# Patient Record
Sex: FEMALE | Race: BLACK OR AFRICAN AMERICAN | NOT HISPANIC OR LATINO | Employment: FULL TIME | URBAN - METROPOLITAN AREA
[De-identification: names, ages, dates, MRNs, and addresses within clinical notes are randomized per-mention and may not be internally consistent; named-entity substitution may affect disease eponyms.]

---

## 2017-01-18 ENCOUNTER — TELEPHONE (OUTPATIENT)
Dept: OBSTETRICS AND GYNECOLOGY | Facility: CLINIC | Age: 32
End: 2017-01-18

## 2017-01-18 ENCOUNTER — LAB VISIT (OUTPATIENT)
Dept: LAB | Facility: HOSPITAL | Age: 32
End: 2017-01-18
Attending: SPECIALIST
Payer: COMMERCIAL

## 2017-01-18 DIAGNOSIS — N91.2 AMENORRHEA: ICD-10-CM

## 2017-01-18 DIAGNOSIS — N91.2 AMENORRHEA: Primary | ICD-10-CM

## 2017-01-18 LAB
ESTRADIOL SERPL-MCNC: 98 PG/ML
FSH SERPL-ACNC: 3.4 MIU/ML
LH SERPL-ACNC: 3.9 MIU/ML
TSH SERPL DL<=0.005 MIU/L-ACNC: 1 UIU/ML

## 2017-01-18 PROCEDURE — 83002 ASSAY OF GONADOTROPIN (LH): CPT

## 2017-01-18 PROCEDURE — 36415 COLL VENOUS BLD VENIPUNCTURE: CPT | Mod: PO

## 2017-01-18 PROCEDURE — 83001 ASSAY OF GONADOTROPIN (FSH): CPT

## 2017-01-18 PROCEDURE — 84402 ASSAY OF FREE TESTOSTERONE: CPT

## 2017-01-18 PROCEDURE — 84443 ASSAY THYROID STIM HORMONE: CPT

## 2017-01-18 PROCEDURE — 82670 ASSAY OF TOTAL ESTRADIOL: CPT

## 2017-01-18 NOTE — TELEPHONE ENCOUNTER
Last cycle in November.  Negative UPT.  Tubal ligation.  Thinks her hormones are off.    Please advise.

## 2017-01-18 NOTE — TELEPHONE ENCOUNTER
----- Message from Carmen Swift sent at 1/18/2017  7:37 AM CST -----  Patient requesting to speak with nurse concerning personal matter (only information given)please call patient back at [phone] to advise.

## 2017-01-22 LAB — TESTOST FREE SERPL-MCNC: 1.1 PG/ML

## 2017-01-23 ENCOUNTER — TELEPHONE (OUTPATIENT)
Dept: OBSTETRICS AND GYNECOLOGY | Facility: CLINIC | Age: 32
End: 2017-01-23

## 2017-01-23 NOTE — TELEPHONE ENCOUNTER
----- Message from Karen Miner sent at 1/23/2017 12:26 PM CST -----  Contact: self  Patient called regarding lab results. Please contact [phone]

## 2017-01-24 ENCOUNTER — PATIENT MESSAGE (OUTPATIENT)
Dept: OBSTETRICS AND GYNECOLOGY | Facility: CLINIC | Age: 32
End: 2017-01-24

## 2017-01-24 NOTE — TELEPHONE ENCOUNTER
Patient notified.  Questions what the next step would be.  Amenorrhea x 2 months.  Negative UPT and tubal ligation

## 2017-01-31 ENCOUNTER — OFFICE VISIT (OUTPATIENT)
Dept: OBSTETRICS AND GYNECOLOGY | Facility: CLINIC | Age: 32
End: 2017-01-31
Payer: COMMERCIAL

## 2017-01-31 VITALS — WEIGHT: 175.94 LBS | BODY MASS INDEX: 30.04 KG/M2 | HEIGHT: 64 IN

## 2017-01-31 DIAGNOSIS — Z01.419 NORMAL GYNECOLOGIC EXAMINATION: Primary | ICD-10-CM

## 2017-01-31 DIAGNOSIS — N76.0 BV (BACTERIAL VAGINOSIS): ICD-10-CM

## 2017-01-31 DIAGNOSIS — B96.89 BV (BACTERIAL VAGINOSIS): ICD-10-CM

## 2017-01-31 PROCEDURE — 1159F MED LIST DOCD IN RCRD: CPT | Mod: S$GLB,,, | Performed by: SPECIALIST

## 2017-01-31 PROCEDURE — 88175 CYTOPATH C/V AUTO FLUID REDO: CPT

## 2017-01-31 PROCEDURE — 99999 PR PBB SHADOW E&M-EST. PATIENT-LVL III: CPT | Mod: PBBFAC,,, | Performed by: SPECIALIST

## 2017-01-31 PROCEDURE — 99395 PREV VISIT EST AGE 18-39: CPT | Mod: S$GLB,,, | Performed by: SPECIALIST

## 2017-01-31 RX ORDER — METRONIDAZOLE 500 MG/1
500 TABLET ORAL EVERY 12 HOURS
Qty: 14 TABLET | Refills: 0 | Status: SHIPPED | OUTPATIENT
Start: 2017-01-31 | End: 2017-02-07

## 2017-01-31 NOTE — PROGRESS NOTES
30 yo BF presents for annual gyn evaluation. Pt c/o vaginal d/c with itching on occasion.  Past Medical History   Diagnosis Date    Abnormal Pap smear      cryo    Abnormal Pap smear of vagina      cryo    Constipation 2011       Past Surgical History   Procedure Laterality Date    Tubal ligation      Cryotherapy         Family History   Problem Relation Age of Onset    Other Father     Multiple myeloma Father       in 60s    Hypertension Mother     Diabetes type II Brother     Breast cancer Neg Hx     Ovarian cancer Neg Hx        Social History     Social History    Marital status: Single     Spouse name: N/A    Number of children: N/A    Years of education: N/A     Social History Main Topics    Smoking status: Never Smoker    Smokeless tobacco: Never Used    Alcohol use Yes      Comment: 1 glass per night.     Drug use: No    Sexual activity: Yes     Partners: Male     Birth control/ protection: Condom, Surgical     Other Topics Concern    None     Social History Narrative       Current Outpatient Prescriptions   Medication Sig Dispense Refill    alprazolam (XANAX) 0.25 MG tablet Take 1 tablet (0.25 mg total) by mouth 3 (three) times daily as needed for Insomnia. 1 Tablet(s) Oral PRN Every 8 hours. 60 tablet 0    metoprolol tartrate (LOPRESSOR) 25 MG tablet Take one or 2 daily for test anxiety 20 tablet 0     No current facility-administered medications for this visit.        Review of patient's allergies indicates:   Allergen Reactions    No known drug allergies        Review of System:   General: no chills, fever, night sweats, weight gain or weight loss  Psychological: no depression or suicidal ideation  Breasts: no new or changing breast lumps, nipple discharge or masses.  Respiratory: no cough, shortness of breath, or wheezing  Cardiovascular: no chest pain or dyspnea on exertion  Gastrointestinal: no abdominal pain, change in bowel habits, or black or bloody  stools  Genito-Urinary: no incontinence, urinary frequency/urgency or vulvar/vaginal symptoms, pelvic pain or abnormal vaginal bleeding.  Musculoskeletal: no gait disturbance or muscular weakness    General Appearance:  Alert, cooperative, no distress, appears stated age   Head:  Normocephalic, without obvious abnormality, atraumatic   Eyes:  PERRL, conjunctiva/corneas clear, EOM's intact, fundi benign, both eyes   Ears:  Normal TM's and external ear canals, both ears   Nose: Nares normal, septum midline,mucosa normal, no drainage or sinus tenderness   Throat: Lips, mucosa, and tongue normal; teeth and gums normal   Neck: Supple, symmetrical, trachea midline, no adenopathy;  thyroid: not enlarged, symmetric, no tenderness/mass/nodules; no carotid bruit or JVD   Back:   Symmetric, no curvature, ROM normal, no CVA tenderness   Lungs:   Clear to auscultation bilaterally, respirations unlabored   Breasts:  No masses or tenderness   Heart:  Regular rate and rhythm, S1 and S2 normal, no murmur, rub, or gallop   Abdomen:   Soft, non-tender, bowel sounds active all four quadrants,  no masses, no organomegaly    Genitourinary:   External rectal exam shows no thrombosed external hemorrhoids.   Pelvic exam was performed with patient supine.  No labial fusion.  There is no rash, lesion or injury on the right labia.  There is no rash, lesion or injury on the left labia.  No bleeding and no signs of injury around the vaginal introitus, urethra is without lesions and well supported. The cervix is visualized with no discharge, lesions or friability.  Vagina- positivw  No significant Cystocele, Enterocele or rectocele, and uterus well supported.  Bimanual exam:  The urethra is normal to palpation and there are no palpable vaginal wall masses.  Uterus is not deviated, not enlarged, not fixed, normal shape and not tender.  Cervix exhibits no motion tenderness.   Right adnexum displays no mass and no tenderness.  Left adnexum displays  no mass and no tenderness.   Extremities: Extremities normal, atraumatic, no cyanosis or edema   Pulses: 2+ and symmetric   Skin: Skin color, texture, turgor normal, no rashes or lesions   Lymph nodes: Cervical, supraclavicular, and axillary nodes normal   Neurologic: Normal       PAP submitted    I discussed BV and treatment options  I will prescribe Flagyl and pt to contact me if no improvement

## 2017-01-31 NOTE — MR AVS SNAPSHOT
"    [facility]  [address] [place] [address]  Phone: [phone]                  Treasure Higgins   2017 9:40 AM   Office Visit    Description:  Female : 1985   Provider:  Emmanuel Briscoe MD   Department:  [facility]           Reason for Visit     Well Woman     Vaginitis                To Do List           Goals (5 Years of Data)     None      [facility]     [facility] On Call Nurse Care Line -  Assistance  Registered nurses in the [facility] On Call Center provide clinical advisement, health education, appointment booking, and other advisory services.  Call for this free service at [phone].             Medications           Message regarding Medications     Verify the changes and/or additions to your medication regime listed below are the same as discussed with your clinician today.  If any of these changes or additions are incorrect, please notify your healthcare provider.        STOP taking these medications     metronidazole (METROGEL) 0.75 % vaginal gel            Verify that the below list of medications is an accurate representation of the medications you are currently taking.  If none reported, the list may be blank. If incorrect, please contact your healthcare provider. Carry this list with you in case of emergency.           Current Medications     alprazolam (XANAX) 0.25 MG tablet Take 1 tablet (0.25 mg total) by mouth 3 (three) times daily as needed for Insomnia. 1 Tablet(s) Oral PRN Every 8 hours.    metoprolol tartrate (LOPRESSOR) 25 MG tablet Take one or 2 daily for test anxiety           Clinical Reference Information           Vital Signs - Last Recorded  Most recent update: 2017  9:53 AM by Neena Manzo LPN    Ht Wt LMP BMI       5' 4" (1.626 m) 79.8 kg (175 lb 14.8 oz) 11/15/2016 30.2 kg/m2       Allergies as of 2017     No Known Drug Allergies      Immunizations Administered on Date of Encounter - 2017     None "

## 2018-05-29 ENCOUNTER — OFFICE VISIT (OUTPATIENT)
Dept: OBSTETRICS AND GYNECOLOGY | Facility: CLINIC | Age: 33
End: 2018-05-29
Payer: MEDICAID

## 2018-05-29 VITALS
SYSTOLIC BLOOD PRESSURE: 112 MMHG | BODY MASS INDEX: 28.04 KG/M2 | WEIGHT: 163.38 LBS | DIASTOLIC BLOOD PRESSURE: 60 MMHG

## 2018-05-29 DIAGNOSIS — Z01.419 NORMAL GYNECOLOGIC EXAMINATION: ICD-10-CM

## 2018-05-29 DIAGNOSIS — Z12.4 ENCOUNTER FOR PAP SMEAR OF CERVIX WITH HPV DNA COTESTING: Primary | ICD-10-CM

## 2018-05-29 PROCEDURE — 99213 OFFICE O/P EST LOW 20 MIN: CPT | Mod: PBBFAC,PN | Performed by: SPECIALIST

## 2018-05-29 PROCEDURE — 87624 HPV HI-RISK TYP POOLED RSLT: CPT

## 2018-05-29 PROCEDURE — 99999 PR PBB SHADOW E&M-EST. PATIENT-LVL III: CPT | Mod: PBBFAC,,, | Performed by: SPECIALIST

## 2018-05-29 PROCEDURE — 99395 PREV VISIT EST AGE 18-39: CPT | Mod: S$PBB,,, | Performed by: SPECIALIST

## 2018-05-29 PROCEDURE — 88175 CYTOPATH C/V AUTO FLUID REDO: CPT

## 2018-05-29 RX ORDER — SERTRALINE HYDROCHLORIDE 50 MG/1
TABLET, FILM COATED ORAL
COMMUNITY

## 2018-05-29 RX ORDER — DEXTROAMPHETAMINE SACCHARATE, AMPHETAMINE ASPARTATE, DEXTROAMPHETAMINE SULFATE AND AMPHETAMINE SULFATE 3.75; 3.75; 3.75; 3.75 MG/1; MG/1; MG/1; MG/1
TABLET ORAL
COMMUNITY

## 2018-05-29 RX ORDER — HYDROXYZINE PAMOATE 100 MG/1
CAPSULE ORAL
COMMUNITY

## 2018-05-29 NOTE — PROGRESS NOTES
31 yo BF presents for annual gyn evaluation. No overt gyn complaints.  Past Medical History:   Diagnosis Date    Abnormal Pap smear     cryo    Abnormal Pap smear of vagina     cryo    Constipation 2011       Past Surgical History:   Procedure Laterality Date    CRYOTHERAPY      TUBAL LIGATION         Family History   Problem Relation Age of Onset    Other Father     Multiple myeloma Father          in 60s    Hypertension Mother     Diabetes type II Brother     Breast cancer Neg Hx     Ovarian cancer Neg Hx        Social History     Social History    Marital status: Single     Spouse name: N/A    Number of children: N/A    Years of education: N/A     Social History Main Topics    Smoking status: Never Smoker    Smokeless tobacco: Never Used    Alcohol use Yes      Comment: 1 glass per night.     Drug use: No    Sexual activity: Yes     Partners: Male     Birth control/ protection: Condom, Surgical     Other Topics Concern    None     Social History Narrative    None       Current Outpatient Prescriptions   Medication Sig Dispense Refill    alprazolam (XANAX) 0.25 MG tablet Take 1 tablet (0.25 mg total) by mouth 3 (three) times daily as needed for Insomnia. 1 Tablet(s) Oral PRN Every 8 hours. 60 tablet 0    dextroamphetamine-amphetamine (ADDERALL) 15 mg tablet dextroamphetamine-amphetamine 15 mg tablet      hydrOXYzine (VISTARIL) 100 MG capsule hydroxyzine pamoate 100 mg capsule      sertraline (ZOLOFT) 50 MG tablet sertraline 50 mg tablet       No current facility-administered medications for this visit.        Review of patient's allergies indicates:   Allergen Reactions    No known drug allergies        Review of System:   General: no chills, fever, night sweats, weight gain or weight loss  Psychological: no depression or suicidal ideation  Breasts: no new or changing breast lumps, nipple discharge or masses.  Respiratory: no cough, shortness of breath, or  wheezing  Cardiovascular: no chest pain or dyspnea on exertion  Gastrointestinal: no abdominal pain, change in bowel habits, or black or bloody stools  Genito-Urinary: no incontinence, urinary frequency/urgency or vulvar/vaginal symptoms, pelvic pain or abnormal vaginal bleeding.  Musculoskeletal: no gait disturbance or muscular weakness    General Appearance:  Alert, cooperative, no distress, appears stated age   Head:  Normocephalic, without obvious abnormality, atraumatic   Eyes:  PERRL, conjunctiva/corneas clear, EOM's intact, fundi benign, both eyes   Ears:  Normal TM's and external ear canals, both ears   Nose: Nares normal, septum midline,mucosa normal, no drainage or sinus tenderness   Throat: Lips, mucosa, and tongue normal; teeth and gums normal   Neck: Supple, symmetrical, trachea midline, no adenopathy;  thyroid: not enlarged, symmetric, no tenderness/mass/nodules; no carotid bruit or JVD   Back:   Symmetric, no curvature, ROM normal, no CVA tenderness   Lungs:   Clear to auscultation bilaterally, respirations unlabored   Breasts:  No masses or tenderness   Heart:  Regular rate and rhythm, S1 and S2 normal, no murmur, rub, or gallop   Abdomen:   Soft, non-tender, bowel sounds active all four quadrants,  no masses, no organomegaly    Genitourinary:   External rectal exam shows no thrombosed external hemorrhoids.   Pelvic exam was performed with patient supine.  No labial fusion.  There is no rash, lesion or injury on the right labia.  There is no rash, lesion or injury on the left labia.  No bleeding and no signs of injury around the vaginal introitus, urethra is without lesions and well supported. The cervix is visualized with no discharge, lesions or friability.  No vaginal discharge found.  No significant Cystocele, Enterocele or rectocele, and uterus well supported.  Bimanual exam:  The urethra is normal to palpation and there are no palpable vaginal wall masses.  Uterus is not deviated, not enlarged,  not fixed, normal shape and not tender.  Cervix exhibits no motion tenderness.   Right adnexum displays no mass and no tenderness.  Left adnexum displays no mass and no tenderness.   Extremities: Extremities normal, atraumatic, no cyanosis or edema   Pulses: 2+ and symmetric   Skin: Skin color, texture, turgor normal, no rashes or lesions   Lymph nodes: Cervical, supraclavicular, and axillary nodes normal   Neurologic: Normal         PAP submitted    COUNSELING:  The patient was counseled today on osteoporosis prevention, calcium supplementation, and regular weight bearing exercise. The patient was also counseled today on ACS PAP guidelines, with recommendations for yearly pelvic exams unless their uterus, cervix, and ovaries were removed for benign reasons; in that case, examinations every 3-5 years are recommended. The patient was also counseled regarding monthly breast self-examination, routine STD screening for at-risk populations, prophylactic immunizations for transmitted infections such as HPV, Pertussis, or Influenza as appropriate, and yearly mammograms when indicated by ACS guidelines. She was advised to see her primary care physician for all other health maintenance.   FOLLOW-UP with me for next routine visit.

## 2018-06-01 ENCOUNTER — TELEPHONE (OUTPATIENT)
Dept: OBSTETRICS AND GYNECOLOGY | Facility: CLINIC | Age: 33
End: 2018-06-01

## 2018-06-01 LAB
HPV16 AG SPEC QL: NEGATIVE
HPV16+18+H RISK 12 DNA CVX-IMP: NEGATIVE
HPV18 DNA SPEC QL NAA+PROBE: NEGATIVE

## 2018-06-01 NOTE — TELEPHONE ENCOUNTER
----- Message from Ashish Varela sent at 6/1/2018  1:45 PM CDT -----  Contact: same  Type:  Test Results    Who Called:  patient  Name of Test (Lab/Mammo/Etc):  labs  Date of Test:  5/29/18  Ordering Provider:  Luis Felipe  Where the test was performed:  office  Best Call Back Number:  [phone]  Additional Information:  n/a

## 2018-08-28 ENCOUNTER — TELEPHONE (OUTPATIENT)
Dept: OBSTETRICS AND GYNECOLOGY | Facility: CLINIC | Age: 33
End: 2018-08-28

## 2018-08-28 RX ORDER — FLUCONAZOLE 200 MG/1
200 TABLET ORAL ONCE
Qty: 1 TABLET | Refills: 0 | Status: SHIPPED | OUTPATIENT
Start: 2018-08-28 | End: 2018-08-28

## 2018-08-28 NOTE — TELEPHONE ENCOUNTER
Patient taking abx for tooth infection now with vaginal discharge and irritation requesting Rx for yeast

## 2018-08-28 NOTE — TELEPHONE ENCOUNTER
----- Message from Cindy Saenz sent at 8/28/2018  3:39 PM CDT -----  Contact: Patient  Treasure, patient [phone] calling to speak with nurse in office, did not want to go into detail. Please advise. Thanks.

## 2018-08-30 ENCOUNTER — LAB VISIT (OUTPATIENT)
Dept: LAB | Facility: HOSPITAL | Age: 33
End: 2018-08-30
Attending: SPECIALIST
Payer: COMMERCIAL

## 2018-08-30 ENCOUNTER — OFFICE VISIT (OUTPATIENT)
Dept: OBSTETRICS AND GYNECOLOGY | Facility: CLINIC | Age: 33
End: 2018-08-30
Payer: COMMERCIAL

## 2018-08-30 VITALS — WEIGHT: 161.38 LBS | BODY MASS INDEX: 27.7 KG/M2

## 2018-08-30 DIAGNOSIS — Z11.3 SCREENING EXAMINATION FOR STD (SEXUALLY TRANSMITTED DISEASE): ICD-10-CM

## 2018-08-30 DIAGNOSIS — B37.31 YEAST VAGINITIS: ICD-10-CM

## 2018-08-30 DIAGNOSIS — Z11.3 SCREENING EXAMINATION FOR STD (SEXUALLY TRANSMITTED DISEASE): Primary | ICD-10-CM

## 2018-08-30 DIAGNOSIS — R30.0 BURNING WITH URINATION: ICD-10-CM

## 2018-08-30 LAB
BILIRUB SERPL-MCNC: NORMAL MG/DL
BLOOD URINE, POC: NORMAL
COLOR, POC UA: YELLOW
GLUCOSE UR QL STRIP: NORMAL
KETONES UR QL STRIP: NORMAL
LEUKOCYTE ESTERASE URINE, POC: NORMAL
NITRITE, POC UA: NORMAL
PH, POC UA: 7
PROTEIN, POC: NORMAL
SPECIFIC GRAVITY, POC UA: NORMAL
UROBILINOGEN, POC UA: 1

## 2018-08-30 PROCEDURE — 86703 HIV-1/HIV-2 1 RESULT ANTBDY: CPT

## 2018-08-30 PROCEDURE — 99213 OFFICE O/P EST LOW 20 MIN: CPT | Mod: S$GLB,,, | Performed by: SPECIALIST

## 2018-08-30 PROCEDURE — 87491 CHLMYD TRACH DNA AMP PROBE: CPT

## 2018-08-30 PROCEDURE — 87086 URINE CULTURE/COLONY COUNT: CPT

## 2018-08-30 PROCEDURE — 99213 OFFICE O/P EST LOW 20 MIN: CPT | Mod: PBBFAC,PN | Performed by: SPECIALIST

## 2018-08-30 PROCEDURE — 81002 URINALYSIS NONAUTO W/O SCOPE: CPT | Mod: PBBFAC,PN | Performed by: SPECIALIST

## 2018-08-30 PROCEDURE — 80074 ACUTE HEPATITIS PANEL: CPT

## 2018-08-30 PROCEDURE — 99999 PR PBB SHADOW E&M-EST. PATIENT-LVL III: CPT | Mod: PBBFAC,,, | Performed by: SPECIALIST

## 2018-08-30 PROCEDURE — 86696 HERPES SIMPLEX TYPE 2 TEST: CPT

## 2018-08-30 PROCEDURE — 86592 SYPHILIS TEST NON-TREP QUAL: CPT

## 2018-08-30 PROCEDURE — 86694 HERPES SIMPLEX NES ANTBDY: CPT

## 2018-08-30 RX ORDER — TERCONAZOLE 8 MG/G
1 CREAM VAGINAL NIGHTLY
Qty: 20 G | Refills: 0 | Status: SHIPPED | OUTPATIENT
Start: 2018-08-30

## 2018-08-30 RX ORDER — NITROFURANTOIN 25; 75 MG/1; MG/1
100 CAPSULE ORAL 2 TIMES DAILY
Qty: 14 CAPSULE | Refills: 0 | Status: SHIPPED | OUTPATIENT
Start: 2018-08-30 | End: 2018-09-06

## 2018-08-30 RX ORDER — AMOXICILLIN 875 MG/1
TABLET, FILM COATED ORAL
COMMUNITY
Start: 2018-08-23

## 2018-08-30 NOTE — PROGRESS NOTES
34 yo BF presents for desired STD screening. Denies any overt gyncomplaints.    Past Medical History:   Diagnosis Date    Abnormal Pap smear     cryo    Abnormal Pap smear of vagina     cryo    Constipation 2011       Past Surgical History:   Procedure Laterality Date    CRYOTHERAPY      TUBAL LIGATION         Family History   Problem Relation Age of Onset    Other Father     Multiple myeloma Father          in 60s    Hypertension Mother     Diabetes type II Brother     Breast cancer Neg Hx     Ovarian cancer Neg Hx        Social History     Socioeconomic History    Marital status: Single     Spouse name: None    Number of children: None    Years of education: None    Highest education level: None   Social Needs    Financial resource strain: None    Food insecurity - worry: None    Food insecurity - inability: None    Transportation needs - medical: None    Transportation needs - non-medical: None   Occupational History    None   Tobacco Use    Smoking status: Never Smoker    Smokeless tobacco: Never Used   Substance and Sexual Activity    Alcohol use: Yes     Comment: 1 glass per night.     Drug use: No    Sexual activity: Yes     Partners: Male     Birth control/protection: Condom, Surgical   Other Topics Concern    None   Social History Narrative    None       Current Outpatient Medications   Medication Sig Dispense Refill    alprazolam (XANAX) 0.25 MG tablet Take 1 tablet (0.25 mg total) by mouth 3 (three) times daily as needed for Insomnia. 1 Tablet(s) Oral PRN Every 8 hours. 60 tablet 0    amoxicillin (AMOXIL) 875 MG tablet       dextroamphetamine-amphetamine (ADDERALL) 15 mg tablet dextroamphetamine-amphetamine 15 mg tablet      hydrOXYzine (VISTARIL) 100 MG capsule hydroxyzine pamoate 100 mg capsule      sertraline (ZOLOFT) 50 MG tablet sertraline 50 mg tablet       No current facility-administered medications for this visit.        Review of patient's allergies  indicates:   Allergen Reactions    No known drug allergies        Review of System:   General: no chills, fever, night sweats, weight gain or weight loss  Psychological: no depression or suicidal ideation  Breasts: no new or changing breast lumps, nipple discharge or masses.  Respiratory: no cough, shortness of breath, or wheezing  Cardiovascular: no chest pain or dyspnea on exertion  Gastrointestinal: no abdominal pain, change in bowel habits, or black or bloody stools  Genito-Urinary: no incontinence, urinary frequency/urgency or vulvar/vaginal symptoms, pelvic pain or abnormal vaginal bleeding.  Musculoskeletal: no gait disturbance or muscular weakness    General Appearance:  Alert, cooperative, no distress, appears stated age   Head:  Normocephalic, without obvious abnormality, atraumatic   Eyes:  PERRL, conjunctiva/corneas clear, EOM's intact, fundi benign, both eyes   Ears:  Normal TM's and external ear canals, both ears   Nose: Nares normal, septum midline,mucosa normal, no drainage or sinus tenderness   Throat: Lips, mucosa, and tongue normal; teeth and gums normal   Neck: Supple, symmetrical, trachea midline, no adenopathy;  thyroid: not enlarged, symmetric, no tenderness/mass/nodules; no carotid bruit or JVD   Back:   Symmetric, no curvature, ROM normal, no CVA tenderness   Lungs:   Clear to auscultation bilaterally, respirations unlabored   Breasts:  No masses or tenderness   Heart:  Regular rate and rhythm, S1 and S2 normal, no murmur, rub, or gallop   Abdomen:   Soft, non-tender, bowel sounds active all four quadrants,  no masses, no organomegaly    Genitourinary:   External rectal exam shows no thrombosed external hemorrhoids.   Pelvic exam was performed with patient supine.  No labial fusion.  There is no rash, lesion or injury on the right labia.  There is no rash, lesion or injury on the left labia.  No bleeding and no signs of injury around the vaginal introitus, urethra is without lesions and  well supported. The cervix is visualized with no discharge, lesions or friability.  Vagina  Positive white adherent d/c consistent with yeast  No significant Cystocele, Enterocele or rectocele, and uterus well supported.  Bimanual exam:  The urethra is normal to palpation and there are no palpable vaginal wall masses.  Uterus is not deviated, not enlarged, not fixed, normal shape and not tender.  Cervix exhibits no motion tenderness.   Right adnexum displays no mass and no tenderness.  Left adnexum displays no mass and no tenderness.   Extremities: Extremities normal, atraumatic, no cyanosis or edema   Pulses: 2+ and symmetric   Skin: Skin color, texture, turgor normal, no rashes or lesions   Lymph nodes: Cervical, supraclavicular, and axillary nodes normal   Neurologic: Normal       GC/CZ submitted    Urine positive nitrite, pos LE    I will treat UTI and yeast  Will obtain STD profile  U /A C and S  Will follow

## 2018-08-31 LAB
C TRACH DNA SPEC QL NAA+PROBE: NOT DETECTED
HAV IGM SERPL QL IA: NEGATIVE
HBV CORE IGM SERPL QL IA: NEGATIVE
HBV SURFACE AG SERPL QL IA: NEGATIVE
HCV AB SERPL QL IA: NEGATIVE
HIV 1+2 AB+HIV1 P24 AG SERPL QL IA: NEGATIVE
HSV1 IGG SERPL QL IA: NEGATIVE
HSV2 IGG SERPL QL IA: POSITIVE
N GONORRHOEA DNA SPEC QL NAA+PROBE: NOT DETECTED
RPR SER QL: NORMAL

## 2018-09-01 LAB — BACTERIA UR CULT: NORMAL

## 2018-09-04 LAB — HSV AB, IGM BY EIA: NEGATIVE

## 2018-09-11 ENCOUNTER — TELEPHONE (OUTPATIENT)
Dept: OBSTETRICS AND GYNECOLOGY | Facility: CLINIC | Age: 33
End: 2018-09-11

## 2018-09-11 NOTE — TELEPHONE ENCOUNTER
----- Message from Jose Angel Borden sent at 9/11/2018  2:57 PM CDT -----  Contact: Treasure  She is requesting a call back. She has questions she did not want to leave [phone]. Thanks!

## 2018-09-11 NOTE — TELEPHONE ENCOUNTER
Pt notified. Will take UPT tonight and call with results tomorrow. If negative will call medication in

## 2018-09-12 ENCOUNTER — TELEPHONE (OUTPATIENT)
Dept: OBSTETRICS AND GYNECOLOGY | Facility: CLINIC | Age: 33
End: 2018-09-12

## 2018-09-12 NOTE — TELEPHONE ENCOUNTER
Pt called back regarding yesterdays message. UPT negative. Provera called to [facility]

## 2019-01-14 ENCOUNTER — TELEPHONE (OUTPATIENT)
Dept: OBSTETRICS AND GYNECOLOGY | Facility: CLINIC | Age: 34
End: 2019-01-14

## 2019-01-14 NOTE — TELEPHONE ENCOUNTER
----- Message from Kristen Parham sent at 1/14/2019 12:38 PM CST -----  Contact: Patient  Type: Needs Medical Advice    Who Called:  Patient    Best Call Back Number: [phone]  Additional Information: Patient is requesting a call back regarding some concerns she is having    Thank you

## 2019-01-17 ENCOUNTER — OFFICE VISIT (OUTPATIENT)
Dept: OBSTETRICS AND GYNECOLOGY | Facility: CLINIC | Age: 34
End: 2019-01-17
Payer: COMMERCIAL

## 2019-01-17 ENCOUNTER — TELEPHONE (OUTPATIENT)
Dept: OBSTETRICS AND GYNECOLOGY | Facility: CLINIC | Age: 34
End: 2019-01-17

## 2019-01-17 VITALS
HEIGHT: 64 IN | WEIGHT: 160.94 LBS | SYSTOLIC BLOOD PRESSURE: 98 MMHG | BODY MASS INDEX: 27.48 KG/M2 | DIASTOLIC BLOOD PRESSURE: 60 MMHG

## 2019-01-17 DIAGNOSIS — N92.6 IRREGULAR MENSES: Primary | ICD-10-CM

## 2019-01-17 PROCEDURE — 99213 OFFICE O/P EST LOW 20 MIN: CPT | Mod: S$GLB,,, | Performed by: SPECIALIST

## 2019-01-17 PROCEDURE — 3008F BODY MASS INDEX DOCD: CPT | Mod: CPTII,S$GLB,, | Performed by: SPECIALIST

## 2019-01-17 PROCEDURE — 99999 PR PBB SHADOW E&M-EST. PATIENT-LVL III: CPT | Mod: PBBFAC,,, | Performed by: SPECIALIST

## 2019-01-17 PROCEDURE — 99999 PR PBB SHADOW E&M-EST. PATIENT-LVL III: ICD-10-PCS | Mod: PBBFAC,,, | Performed by: SPECIALIST

## 2019-01-17 PROCEDURE — 3008F PR BODY MASS INDEX (BMI) DOCUMENTED: ICD-10-PCS | Mod: CPTII,S$GLB,, | Performed by: SPECIALIST

## 2019-01-17 PROCEDURE — 99213 PR OFFICE/OUTPT VISIT, EST, LEVL III, 20-29 MIN: ICD-10-PCS | Mod: S$GLB,,, | Performed by: SPECIALIST

## 2019-01-17 RX ORDER — NORETHINDRONE ACETATE AND ETHINYL ESTRADIOL .02; 1 MG/1; MG/1
1 TABLET ORAL DAILY
Qty: 30 TABLET | Refills: 11 | Status: SHIPPED | OUTPATIENT
Start: 2019-01-17 | End: 2020-03-18

## 2019-01-17 NOTE — TELEPHONE ENCOUNTER
----- Message from Tami Cho sent at 1/17/2019  2:45 PM CST -----  Type:  Pharmacy Calling to Clarify an RX    Name of Caller:  Neelam  Pharmacy Name:  [facility]  Prescription Name:  norethindrone-ethinyl estradiol (MICROGESTIN 1/20) 1-20 mg-mcg per tablet   What do they need to clarify?:  Wrote for 30 pills but they only come in 21 day pack  Best Call Back Number:  [phone]

## 2019-03-07 ENCOUNTER — TELEPHONE (OUTPATIENT)
Dept: OBSTETRICS AND GYNECOLOGY | Facility: CLINIC | Age: 34
End: 2019-03-07

## 2019-03-07 NOTE — TELEPHONE ENCOUNTER
----- Message from Carmen Swift sent at 3/7/2019  1:34 PM CST -----  Type: Needs Medical Advice    Who Called:  Patient  Best Call Back Number: [phone]  Additional Information: Patient requesting to speak with nurse concerning birth control (only information given)please call back to advise.

## 2019-03-07 NOTE — TELEPHONE ENCOUNTER
Patient notified that it is normal to have an irregular cycle for the first 3 packs of a new oral birth control.

## 2019-04-23 ENCOUNTER — TELEPHONE (OUTPATIENT)
Dept: OBSTETRICS AND GYNECOLOGY | Facility: CLINIC | Age: 34
End: 2019-04-23

## 2019-04-23 RX ORDER — PANTOPRAZOLE SODIUM 40 MG/1
TABLET, DELAYED RELEASE ORAL
COMMUNITY
Start: 2019-03-28

## 2019-04-23 RX ORDER — METRONIDAZOLE 7.5 MG/G
1 GEL VAGINAL 2 TIMES DAILY
Qty: 70 G | Refills: 0 | Status: SHIPPED | OUTPATIENT
Start: 2019-04-23 | End: 2022-06-07 | Stop reason: SDUPTHER

## 2019-04-23 NOTE — TELEPHONE ENCOUNTER
----- Message from Rafaela Dickerson sent at 4/23/2019  2:49 PM CDT -----  Contact: self [phone]  She said she is having a discharge, is there something he can call in?  Her pharmacy is:   [id number] - [place] - [address]  [place] [address]  Phone: [phone] Fax: [phone]    Please let her know.  Thank you!

## 2019-04-23 NOTE — TELEPHONE ENCOUNTER
Patient complaining of a discharge with a fishy odor. She would like something into the pharmacy. She would like the metrogel not the tablets; they make her sick.

## 2020-02-03 RX ORDER — ESTAZOLAM 2 MG/1
TABLET ORAL
Qty: 30 TABLET | Refills: 0 | OUTPATIENT
Start: 2020-02-03

## 2020-03-16 ENCOUNTER — TELEPHONE (OUTPATIENT)
Dept: OBSTETRICS AND GYNECOLOGY | Facility: CLINIC | Age: 35
End: 2020-03-16

## 2020-03-16 NOTE — TELEPHONE ENCOUNTER
----- Message from Cheli Duarte sent at 3/16/2020 12:13 PM CDT -----  Type:  RX Refill Request    Who Called:  patient  Refill or New Rx:  refill  RX Name and Strength:  norethindrone-ethinyl estradiol (MICROGESTIN 1/20) 1-20 mg-mcg per tablet  How is the patient currently taking it? (ex. 1XDay):  norethindrone-ethinyl estradiol (MICROGESTIN 1/20) 1-20 mg-mcg per tablet  Preferred Pharmacy with phone number:    [id number] - [place], [place] - [address] [facility]  [address] [place] [address]  Phone: [phone] Fax: [phone]  Local or Mail Order:  local  Ordering Provider: Luis Felipe Sarah Call Back Number: [phone]  Additional Information:

## 2020-03-17 ENCOUNTER — TELEPHONE (OUTPATIENT)
Dept: OBSTETRICS AND GYNECOLOGY | Facility: CLINIC | Age: 35
End: 2020-03-17

## 2020-03-17 ENCOUNTER — OFFICE VISIT (OUTPATIENT)
Dept: OBSTETRICS AND GYNECOLOGY | Facility: CLINIC | Age: 35
End: 2020-03-17
Payer: COMMERCIAL

## 2020-03-17 VITALS
DIASTOLIC BLOOD PRESSURE: 68 MMHG | WEIGHT: 159.19 LBS | BODY MASS INDEX: 27.32 KG/M2 | SYSTOLIC BLOOD PRESSURE: 110 MMHG

## 2020-03-17 DIAGNOSIS — Z01.419 ENCOUNTER FOR GYNECOLOGICAL EXAMINATION WITHOUT ABNORMAL FINDING: Primary | ICD-10-CM

## 2020-03-17 DIAGNOSIS — Z12.4 PAP SMEAR FOR CERVICAL CANCER SCREENING: ICD-10-CM

## 2020-03-17 PROCEDURE — 99395 PREV VISIT EST AGE 18-39: CPT | Mod: S$GLB,,, | Performed by: SPECIALIST

## 2020-03-17 PROCEDURE — 3008F PR BODY MASS INDEX (BMI) DOCUMENTED: ICD-10-PCS | Mod: CPTII,S$GLB,, | Performed by: SPECIALIST

## 2020-03-17 PROCEDURE — 99395 PR PREVENTIVE VISIT,EST,18-39: ICD-10-PCS | Mod: S$GLB,,, | Performed by: SPECIALIST

## 2020-03-17 PROCEDURE — 88175 CYTOPATH C/V AUTO FLUID REDO: CPT

## 2020-03-17 PROCEDURE — 3008F BODY MASS INDEX DOCD: CPT | Mod: CPTII,S$GLB,, | Performed by: SPECIALIST

## 2020-03-17 PROCEDURE — 99999 PR PBB SHADOW E&M-EST. PATIENT-LVL III: CPT | Mod: PBBFAC,,, | Performed by: SPECIALIST

## 2020-03-17 PROCEDURE — 99999 PR PBB SHADOW E&M-EST. PATIENT-LVL III: ICD-10-PCS | Mod: PBBFAC,,, | Performed by: SPECIALIST

## 2020-03-17 NOTE — TELEPHONE ENCOUNTER
----- Message from Gerson Mcneil sent at 3/17/2020  3:37 PM CDT -----  Type:  RX Refill Request    Who Called:  Patient  Refill or New Rx:  Refill  RX Name and Strength:  norethindrone-ethinyl estradiol (MICROGESTIN 1/20) 1-20 mg-mcg per tablet   How is the patient currently taking it? (ex. 1XDay):  1XDay  Is this a 30 day or 90 day RX:  1 Year  Preferred Pharmacy with phone number:    [id number] - [place] - [address] [facility]  [address] [place], [place] [address] [address]  Phone: [phone] Fax: [phone]      Local or Mail Order:  Local  Ordering Provider:  Luis Felipe Sarah Call Back Number:  [phone]  Additional Information:

## 2020-03-17 NOTE — PROGRESS NOTES
33 yo BF presents for annual gyn evaluation. No overt gyn complaints.  Past Medical History:   Diagnosis Date    Abnormal Pap smear     cryo    Abnormal Pap smear of vagina     cryo    Constipation 2011       Past Surgical History:   Procedure Laterality Date    CRYOTHERAPY      TUBAL LIGATION         Family History   Problem Relation Age of Onset    Other Father     Multiple myeloma Father          in 60s    Hypertension Mother     Diabetes type II Brother     Breast cancer Neg Hx     Ovarian cancer Neg Hx        Social History     Socioeconomic History    Marital status: Single     Spouse name: Not on file    Number of children: Not on file    Years of education: Not on file    Highest education level: Not on file   Occupational History    Not on file   Social Needs    Financial resource strain: Not on file    Food insecurity:     Worry: Not on file     Inability: Not on file    Transportation needs:     Medical: Not on file     Non-medical: Not on file   Tobacco Use    Smoking status: Never Smoker    Smokeless tobacco: Never Used   Substance and Sexual Activity    Alcohol use: Yes     Comment: 1 glass per night.     Drug use: No    Sexual activity: Yes     Partners: Male     Birth control/protection: Surgical   Lifestyle    Physical activity:     Days per week: Not on file     Minutes per session: Not on file    Stress: Not on file   Relationships    Social connections:     Talks on phone: Not on file     Gets together: Not on file     Attends [employer] service: Not on file     Active member of club or organization: Not on file     Attends meetings of clubs or organizations: Not on file     Relationship status: Not on file   Other Topics Concern    Not on file   Social History Narrative    Not on file       Current Outpatient Medications   Medication Sig Dispense Refill    alprazolam (XANAX) 0.25 MG tablet Take 1 tablet (0.25 mg total) by mouth 3 (three) times daily as  needed for Insomnia. 1 Tablet(s) Oral PRN Every 8 hours. 60 tablet 0    dextroamphetamine-amphetamine (ADDERALL) 15 mg tablet dextroamphetamine-amphetamine 15 mg tablet      hydrOXYzine (VISTARIL) 100 MG capsule hydroxyzine pamoate 100 mg capsule      metroNIDAZOLE (METROGEL) 0.75 % vaginal gel Place 1 applicator vaginally 2 (two) times daily. 70 g 0    pantoprazole (PROTONIX) 40 MG tablet       sertraline (ZOLOFT) 50 MG tablet sertraline 50 mg tablet      amoxicillin (AMOXIL) 875 MG tablet       norethindrone-ethinyl estradiol (MICROGESTIN 1/20) 1-20 mg-mcg per tablet Take 1 tablet by mouth once daily. 30 tablet 11    terconazole (TERAZOL 3) 0.8 % vaginal cream Place 1 applicator vaginally every evening. (Patient not taking: Reported on 3/17/2020) 20 g 0     No current facility-administered medications for this visit.        Review of patient's allergies indicates:   Allergen Reactions    No known drug allergies        Review of System:   General: no chills, fever, night sweats, weight gain or weight loss  Psychological: no depression or suicidal ideation  Breasts: no new or changing breast lumps, nipple discharge or masses.  Respiratory: no cough, shortness of breath, or wheezing  Cardiovascular: no chest pain or dyspnea on exertion  Gastrointestinal: no abdominal pain, change in bowel habits, or black or bloody stools  Genito-Urinary: no incontinence, urinary frequency/urgency or vulvar/vaginal symptoms, pelvic pain or abnormal vaginal bleeding.  Musculoskeletal: no gait disturbance or muscular weakness                                              General Appearance    A and O x 4, Cooperative, no distress   Breasts    Abdomen   Symmetrical, no masses, no discharge, skin changes , erythema or retraction. No adenopathy  Soft, non-tender, bowel sounds active all four quadrants,  no masses, no organomegaly    Genitourinary:   External rectal exam shows no thrombosed external hemorrhoids.   Pelvic exam was  performed with patient supine.  No labial fusion.  There is no rash, lesion or injury on the right labia.  There is no rash, lesion or injury on the left labia.  No bleeding and no signs of injury around the vaginal introitus, urethra is without lesions and well supported. The cervix is visualized with no discharge, lesions or friability.  No vaginal discharge found.  No significant Cystocele, Enterocele or rectocele, and uterus well supported.  Bimanual exam:  The urethra is normal to palpation and there are no palpable vaginal wall masses.  Uterus is not deviated, not enlarged, not fixed, normal shape and not tender.  Cervix exhibits no motion tenderness.   Right adnexum displays no mass and no tenderness.  Left adnexum displays no mass and no tenderness.   Extremities: Extremities normal, atraumatic, no cyanosis or edema                           PAP submitted    Plan BSE monthly         COUNSELING:  The patient was counseled today on osteoporosis prevention, calcium supplementation, and regular weight bearing exercise. The patient was also counseled today on ACS PAP guidelines, with recommendations for screening PAP smear age 21-65 every 3-5 yearsunless their uterus, cervix, and ovaries were removed for benign reasons. Screening with HPV testing ages 30-65 every 5 years as an alternative. The patient was also counseled regarding monthly breast self-examination, routine STD screening for at-risk populations, prophylactic immunizations for transmitted infections such as HPV, Pertussis, or Influenza as appropriate, and yearly mammograms when indicated by ACS guidelines. She was advised to see her primary care physician for all other health maintenance.   FOLLOW-UP with me for next routine visit.

## 2020-03-18 ENCOUNTER — TELEPHONE (OUTPATIENT)
Dept: OBSTETRICS AND GYNECOLOGY | Facility: CLINIC | Age: 35
End: 2020-03-18

## 2020-03-18 RX ORDER — NORETHINDRONE ACETATE AND ETHINYL ESTRADIOL .02; 1 MG/1; MG/1
1 TABLET ORAL DAILY
Qty: 30 TABLET | Refills: 11 | Status: SHIPPED | OUTPATIENT
Start: 2020-03-18 | End: 2020-12-28

## 2020-03-18 RX ORDER — ESTAZOLAM 2 MG/1
TABLET ORAL
Qty: 30 TABLET | Refills: 0 | Status: SHIPPED | OUTPATIENT
Start: 2020-03-18 | End: 2020-03-18

## 2020-03-18 NOTE — TELEPHONE ENCOUNTER
Spoke with pt and informed her that med was at pharm, per pharm tech. She verbalized understanding.

## 2020-03-18 NOTE — TELEPHONE ENCOUNTER
----- Message from Gerson Mcneil sent at 3/18/2020 11:37 AM CDT -----  Type: Needs Medical Advice    Who Called:  Patient    Pharmacy name and phone #:    [id number] - [place] - [address]  [address]  [place] [address]  Phone: [phone] Fax: [phone]      Best Call Back Number: [phone]  Additional Information: Patient states that she had requested a One Year supply for her MICROGESTIN 1/20, 21, 1-20 mg-mcg per tablet and she only received One Month with no refills  Please call to advise

## 2020-03-25 ENCOUNTER — TELEPHONE (OUTPATIENT)
Dept: OBSTETRICS AND GYNECOLOGY | Facility: CLINIC | Age: 35
End: 2020-03-25

## 2020-03-25 NOTE — TELEPHONE ENCOUNTER
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 She is very nervous about it all and having to wear the N95 is causing her anxiety to increase greatly.  Pt is crying on the phone.    She would like refill of xanax.    Also needs refill of diflucan as she has a yeast infection

## 2020-03-25 NOTE — TELEPHONE ENCOUNTER
----- Message from Javier Ruiz sent at 3/25/2020  4:20 PM CDT -----  Contact: pt  Type: Needs Medical Advice    Who Called:  pt    Best Call Back Number: [phone]  Additional Information: pt is looking for med to be called in.  Please call to discuss.

## 2020-03-26 RX ORDER — ALPRAZOLAM 0.25 MG/1
0.25 TABLET ORAL 3 TIMES DAILY PRN
Qty: 60 TABLET | Refills: 0 | Status: SHIPPED | OUTPATIENT
Start: 2020-03-26 | End: 2022-06-07 | Stop reason: SDUPTHER

## 2020-04-06 LAB
FINAL PATHOLOGIC DIAGNOSIS: NORMAL
Lab: NORMAL

## 2020-05-07 ENCOUNTER — TELEPHONE (OUTPATIENT)
Dept: OBSTETRICS AND GYNECOLOGY | Facility: CLINIC | Age: 35
End: 2020-05-07

## 2020-05-07 NOTE — TELEPHONE ENCOUNTER
----- Message from Phyllis Corona sent at 5/7/2020  2:48 PM CDT -----  Contact: patient  Type: Needs Medical Advice  Who Called:  Patient  Best Call Back Number: [phone] (home)   Additional Information: The patient wants to a call back said she refuse to tell me why she is calling.

## 2020-05-08 ENCOUNTER — TELEPHONE (OUTPATIENT)
Dept: OBSTETRICS AND GYNECOLOGY | Facility: CLINIC | Age: 35
End: 2020-05-08

## 2020-05-08 DIAGNOSIS — B96.89 BV (BACTERIAL VAGINOSIS): Primary | ICD-10-CM

## 2020-05-08 DIAGNOSIS — N76.0 BV (BACTERIAL VAGINOSIS): Primary | ICD-10-CM

## 2020-05-08 RX ORDER — METRONIDAZOLE 7.5 MG/G
1 GEL VAGINAL NIGHTLY
Qty: 70 G | Refills: 1 | Status: SHIPPED | OUTPATIENT
Start: 2020-05-08

## 2020-05-08 NOTE — TELEPHONE ENCOUNTER
Patient called and wanted to TX Vaginal  Infection. Patient stated Dr. Briscoe usually send medication in for them prior to seeing them. Patient has an appointment for next Tuesday and asked if the on call  Could prescribe a prescription to treat yellow discharge with vaginal itching.

## 2020-05-08 NOTE — TELEPHONE ENCOUNTER
----- Message from Carmen Swift sent at 5/8/2020  1:01 PM CDT -----  Type:  Patient Returning Call    Who Called:  Patient  Who Left Message for Patient:  Vee  Does the patient know what this is regarding?: Bacterial Infection  Best Call Back Number:  [phone]  Additional Information:  Please call number above

## 2020-07-15 ENCOUNTER — OFFICE VISIT (OUTPATIENT)
Dept: OBSTETRICS AND GYNECOLOGY | Facility: CLINIC | Age: 35
End: 2020-07-15
Payer: COMMERCIAL

## 2020-07-15 ENCOUNTER — TELEPHONE (OUTPATIENT)
Dept: OBSTETRICS AND GYNECOLOGY | Facility: CLINIC | Age: 35
End: 2020-07-15

## 2020-07-15 DIAGNOSIS — N92.6 IRREGULAR MENSES: Primary | ICD-10-CM

## 2020-07-15 PROCEDURE — 99213 OFFICE O/P EST LOW 20 MIN: CPT | Mod: 95,,, | Performed by: SPECIALIST

## 2020-07-15 PROCEDURE — 99213 PR OFFICE/OUTPT VISIT, EST, LEVL III, 20-29 MIN: ICD-10-PCS | Mod: 95,,, | Performed by: SPECIALIST

## 2020-07-15 NOTE — TELEPHONE ENCOUNTER
----- Message from Tami Cho sent at 7/15/2020 10:27 AM CDT -----  Regarding: advice  Contact: Patient/[phone] (home)  Type: Needs Medical Advice  Who Called:  Patient/[phone]   Additional Information: Patient has been on her menstrual cycle for over a week now. She is on birth control bills and it is not time for her cycle. It is starting to get heavier than it was. Please call to advise.

## 2020-07-15 NOTE — TELEPHONE ENCOUNTER
Pt states that her cycle can on before time according to pills. Cycle has been on for about a week, noticing that it is getting heavier. No pills missed and have been taking at the same time. Noticed new pain in stomach, no cramps, just a new pain. VV scheduled

## 2020-07-17 NOTE — PROGRESS NOTES
The patient location is: Home  The chief complaint leading to consultation is: breakthrough bleeding    Visit type: Televisit    Face to Face time with patient: 10   minutes of total time spent on the encounter, which includes face to face time and non-face to face time preparing to see the patient (eg, review of tests), Obtaining and/or reviewing separately obtained history, Documenting clinical information in the electronic or other health record, Independently interpreting results (not separately reported) and communicating results to the patient/family/caregiver, or Care coordination (not separately reported).         Each patient to whom he or she provides medical services by telemedicine is:  (1) informed of the relationship between the physician and patient and the respective role of any other health care provider with respect to management of the patient; and (2) notified that he or she may decline to receive medical services by telemedicine and may withdraw from such care at any time.    Notes:     35 yo BF with complaint episode of BTB approx 2 weeks following menses. Moderate flow. Pt denies pain, f/c, n/v, previous irregular menses. Neg UPT and denies presumptive s/s.  I discussed occasional anovulation and menstrual irregularity not unusual and no intervetion is needed unless recurrent , heavy or prolonged.  I answered all questions and rec expectent management    Past Medical History:   Diagnosis Date    Abnormal Pap smear     cryo    Abnormal Pap smear of vagina     cryo    Constipation 2011       Past Surgical History:   Procedure Laterality Date    CRYOTHERAPY      TUBAL LIGATION         Family History   Problem Relation Age of Onset    Other Father     Multiple myeloma Father          in 60s    Hypertension Mother     Diabetes type II Brother     Breast cancer Neg Hx     Ovarian cancer Neg Hx        Social History     Socioeconomic History    Marital status: Single     Spouse  name: Not on file    Number of children: Not on file    Years of education: Not on file    Highest education level: Not on file   Occupational History    Not on file   Social Needs    Financial resource strain: Not on file    Food insecurity     Worry: Not on file     Inability: Not on file    Transportation needs     Medical: Not on file     Non-medical: Not on file   Tobacco Use    Smoking status: Never Smoker    Smokeless tobacco: Never Used   Substance and Sexual Activity    Alcohol use: Yes     Comment: 1 glass per night.     Drug use: No    Sexual activity: Yes     Partners: Male     Birth control/protection: Surgical   Lifestyle    Physical activity     Days per week: Not on file     Minutes per session: Not on file    Stress: Not on file   Relationships    Social connections     Talks on phone: Not on file     Gets together: Not on file     Attends [employer] service: Not on file     Active member of club or organization: Not on file     Attends meetings of clubs or organizations: Not on file     Relationship status: Not on file   Other Topics Concern    Not on file   Social History Narrative    Not on file       Current Outpatient Medications   Medication Sig Dispense Refill    ALPRAZolam (XANAX) 0.25 MG tablet Take 1 tablet (0.25 mg total) by mouth 3 (three) times daily as needed for Insomnia. 1 Tablet(s) Oral PRN Every 8 hours. 60 tablet 0    amoxicillin (AMOXIL) 875 MG tablet       dextroamphetamine-amphetamine (ADDERALL) 15 mg tablet dextroamphetamine-amphetamine 15 mg tablet      hydrOXYzine (VISTARIL) 100 MG capsule hydroxyzine pamoate 100 mg capsule      metroNIDAZOLE (METROGEL) 0.75 % vaginal gel Place 1 applicator vaginally 2 (two) times daily. 70 g 0    metroNIDAZOLE (METROGEL) 0.75 % vaginal gel Place 1 applicator vaginally every evening. 70 g 1    norethindrone-ethinyl estradiol (MICROGESTIN 1/20, 21,) 1-20 mg-mcg per tablet Take 1 tablet by mouth once daily. 30 tablet 11     pantoprazole (PROTONIX) 40 MG tablet       sertraline (ZOLOFT) 50 MG tablet sertraline 50 mg tablet      terconazole (TERAZOL 3) 0.8 % vaginal cream Place 1 applicator vaginally every evening. (Patient not taking: Reported on 3/17/2020) 20 g 0     No current facility-administered medications for this visit.        Review of patient's allergies indicates:   Allergen Reactions    No known drug allergies        Review of System:   General: no chills, fever, night sweats, weight gain or weight loss  Psychological: no depression or suicidal ideation  Breasts: no new or changing breast lumps, nipple discharge or masses.  Respiratory: no cough, shortness of breath, or wheezing  Cardiovascular: no chest pain or dyspnea on exertion  Gastrointestinal: no abdominal pain, change in bowel habits, or black or bloody stools  Genito-Urinary: no incontinence, urinary frequency/urgency or vulvar/vaginal symptoms, pelvic pain. BREAKTHROUGH BLEEDING  Musculoskeletal: no gait disturbance or muscular weakness      Will follow and pt is to contact me for recurrence

## 2021-02-22 ENCOUNTER — OFFICE VISIT (OUTPATIENT)
Dept: OBSTETRICS AND GYNECOLOGY | Facility: CLINIC | Age: 36
End: 2021-02-22
Payer: COMMERCIAL

## 2021-02-22 VITALS
WEIGHT: 165.81 LBS | BODY MASS INDEX: 28.46 KG/M2 | RESPIRATION RATE: 16 BRPM | DIASTOLIC BLOOD PRESSURE: 60 MMHG | SYSTOLIC BLOOD PRESSURE: 105 MMHG

## 2021-02-22 DIAGNOSIS — Z00.00 PREVENTATIVE HEALTH CARE: ICD-10-CM

## 2021-02-22 DIAGNOSIS — B96.89 BV (BACTERIAL VAGINOSIS): Primary | ICD-10-CM

## 2021-02-22 DIAGNOSIS — N76.0 BV (BACTERIAL VAGINOSIS): Primary | ICD-10-CM

## 2021-02-22 PROCEDURE — 3008F PR BODY MASS INDEX (BMI) DOCUMENTED: ICD-10-PCS | Mod: CPTII,S$GLB,, | Performed by: SPECIALIST

## 2021-02-22 PROCEDURE — 99999 PR PBB SHADOW E&M-EST. PATIENT-LVL III: ICD-10-PCS | Mod: PBBFAC,,, | Performed by: SPECIALIST

## 2021-02-22 PROCEDURE — 99395 PR PREVENTIVE VISIT,EST,18-39: ICD-10-PCS | Mod: S$GLB,,, | Performed by: SPECIALIST

## 2021-02-22 PROCEDURE — 99999 PR PBB SHADOW E&M-EST. PATIENT-LVL III: CPT | Mod: PBBFAC,,, | Performed by: SPECIALIST

## 2021-02-22 PROCEDURE — 3008F BODY MASS INDEX DOCD: CPT | Mod: CPTII,S$GLB,, | Performed by: SPECIALIST

## 2021-02-22 PROCEDURE — 1126F AMNT PAIN NOTED NONE PRSNT: CPT | Mod: S$GLB,,, | Performed by: SPECIALIST

## 2021-02-22 PROCEDURE — 99395 PREV VISIT EST AGE 18-39: CPT | Mod: S$GLB,,, | Performed by: SPECIALIST

## 2021-02-22 PROCEDURE — 1126F PR PAIN SEVERITY QUANTIFIED, NO PAIN PRESENT: ICD-10-PCS | Mod: S$GLB,,, | Performed by: SPECIALIST

## 2021-02-22 RX ORDER — TINIDAZOLE 500 MG/1
500 TABLET ORAL 2 TIMES DAILY
Qty: 10 TABLET | Refills: 0 | Status: SHIPPED | OUTPATIENT
Start: 2021-02-22 | End: 2021-02-25

## 2021-02-23 RX ORDER — NORETHINDRONE ACETATE AND ETHINYL ESTRADIOL .02; 1 MG/1; MG/1
1 TABLET ORAL DAILY
Qty: 21 TABLET | Refills: 11 | Status: SHIPPED | OUTPATIENT
Start: 2021-02-23 | End: 2021-11-15

## 2021-04-29 ENCOUNTER — PATIENT MESSAGE (OUTPATIENT)
Dept: RESEARCH | Facility: HOSPITAL | Age: 36
End: 2021-04-29

## 2022-03-17 ENCOUNTER — OFFICE VISIT (OUTPATIENT)
Dept: OBSTETRICS AND GYNECOLOGY | Facility: CLINIC | Age: 37
End: 2022-03-17
Payer: COMMERCIAL

## 2022-03-17 VITALS
BODY MASS INDEX: 28.31 KG/M2 | DIASTOLIC BLOOD PRESSURE: 84 MMHG | HEIGHT: 64 IN | WEIGHT: 165.81 LBS | SYSTOLIC BLOOD PRESSURE: 110 MMHG

## 2022-03-17 DIAGNOSIS — Z12.4 ENCOUNTER FOR PAPANICOLAOU SMEAR FOR CERVICAL CANCER SCREENING: ICD-10-CM

## 2022-03-17 DIAGNOSIS — R39.9 UTI SYMPTOMS: Primary | ICD-10-CM

## 2022-03-17 LAB
BILIRUB SERPL-MCNC: NORMAL MG/DL
BLOOD URINE, POC: NORMAL
CLARITY, POC UA: NORMAL
COLOR, POC UA: YELLOW
GLUCOSE UR QL STRIP: NORMAL
KETONES UR QL STRIP: NORMAL
LEUKOCYTE ESTERASE URINE, POC: NORMAL
NITRITE, POC UA: NORMAL
PH, POC UA: 7
PROTEIN, POC: NORMAL
SPECIFIC GRAVITY, POC UA: NORMAL
UROBILINOGEN, POC UA: NORMAL

## 2022-03-17 PROCEDURE — 99999 PR PBB SHADOW E&M-EST. PATIENT-LVL III: ICD-10-PCS | Mod: PBBFAC,,, | Performed by: SPECIALIST

## 2022-03-17 PROCEDURE — 87624 HPV HI-RISK TYP POOLED RSLT: CPT | Performed by: SPECIALIST

## 2022-03-17 PROCEDURE — 88175 CYTOPATH C/V AUTO FLUID REDO: CPT | Performed by: SPECIALIST

## 2022-03-17 PROCEDURE — 87086 URINE CULTURE/COLONY COUNT: CPT | Performed by: SPECIALIST

## 2022-03-17 PROCEDURE — 1159F MED LIST DOCD IN RCRD: CPT | Mod: CPTII,S$GLB,, | Performed by: SPECIALIST

## 2022-03-17 PROCEDURE — 3079F PR MOST RECENT DIASTOLIC BLOOD PRESSURE 80-89 MM HG: ICD-10-PCS | Mod: CPTII,S$GLB,, | Performed by: SPECIALIST

## 2022-03-17 PROCEDURE — 87186 SC STD MICRODIL/AGAR DIL: CPT | Performed by: SPECIALIST

## 2022-03-17 PROCEDURE — 3074F SYST BP LT 130 MM HG: CPT | Mod: CPTII,S$GLB,, | Performed by: SPECIALIST

## 2022-03-17 PROCEDURE — 81002 POCT URINE DIPSTICK WITHOUT MICROSCOPE: ICD-10-PCS | Mod: S$GLB,,, | Performed by: SPECIALIST

## 2022-03-17 PROCEDURE — 87077 CULTURE AEROBIC IDENTIFY: CPT | Performed by: SPECIALIST

## 2022-03-17 PROCEDURE — 3074F PR MOST RECENT SYSTOLIC BLOOD PRESSURE < 130 MM HG: ICD-10-PCS | Mod: CPTII,S$GLB,, | Performed by: SPECIALIST

## 2022-03-17 PROCEDURE — 81002 URINALYSIS NONAUTO W/O SCOPE: CPT | Mod: S$GLB,,, | Performed by: SPECIALIST

## 2022-03-17 PROCEDURE — 99999 PR PBB SHADOW E&M-EST. PATIENT-LVL III: CPT | Mod: PBBFAC,,, | Performed by: SPECIALIST

## 2022-03-17 PROCEDURE — 1159F PR MEDICATION LIST DOCUMENTED IN MEDICAL RECORD: ICD-10-PCS | Mod: CPTII,S$GLB,, | Performed by: SPECIALIST

## 2022-03-17 PROCEDURE — 87088 URINE BACTERIA CULTURE: CPT | Performed by: SPECIALIST

## 2022-03-17 PROCEDURE — 3008F PR BODY MASS INDEX (BMI) DOCUMENTED: ICD-10-PCS | Mod: CPTII,S$GLB,, | Performed by: SPECIALIST

## 2022-03-17 PROCEDURE — 3079F DIAST BP 80-89 MM HG: CPT | Mod: CPTII,S$GLB,, | Performed by: SPECIALIST

## 2022-03-17 PROCEDURE — 3008F BODY MASS INDEX DOCD: CPT | Mod: CPTII,S$GLB,, | Performed by: SPECIALIST

## 2022-03-17 PROCEDURE — 99213 PR OFFICE/OUTPT VISIT, EST, LEVL III, 20-29 MIN: ICD-10-PCS | Mod: S$GLB,,, | Performed by: SPECIALIST

## 2022-03-17 PROCEDURE — 99213 OFFICE O/P EST LOW 20 MIN: CPT | Mod: S$GLB,,, | Performed by: SPECIALIST

## 2022-03-17 NOTE — PROGRESS NOTES
35 yo BF presents for annual gyn eval. C/O strong ammonia smell to urine  Denies dysuria, flank paiin ,hematuria.  U/A neg LE neg Nitrite  Past Medical History:   Diagnosis Date    Abnormal Pap smear     cryo    Abnormal Pap smear of vagina     cryo    Constipation 2011       Past Surgical History:   Procedure Laterality Date    CRYOTHERAPY      TUBAL LIGATION         Family History   Problem Relation Age of Onset    Other Father     Multiple myeloma Father          in 60s    Hypertension Mother     Diabetes type II Brother     Breast cancer Neg Hx     Ovarian cancer Neg Hx        Social History     Socioeconomic History    Marital status: Single   Tobacco Use    Smoking status: Never Smoker    Smokeless tobacco: Never Used   Substance and Sexual Activity    Alcohol use: Yes     Comment: 1 glass per night.     Drug use: No    Sexual activity: Yes     Partners: Male     Birth control/protection: Surgical       Current Outpatient Medications   Medication Sig Dispense Refill    ALPRAZolam (XANAX) 0.25 MG tablet Take 1 tablet (0.25 mg total) by mouth 3 (three) times daily as needed for Insomnia. 1 Tablet(s) Oral PRN Every 8 hours. 60 tablet 0    amoxicillin (AMOXIL) 875 MG tablet       dextroamphetamine-amphetamine (ADDERALL) 15 mg tablet dextroamphetamine-amphetamine 15 mg tablet      hydrOXYzine (VISTARIL) 100 MG capsule hydroxyzine pamoate 100 mg capsule      MARC , 21, 1-20 mg-mcg per tablet TAKE 1 TABLET BY MOUTH ONCE DAILY 21 tablet 0    metroNIDAZOLE (METROGEL) 0.75 % vaginal gel Place 1 applicator vaginally every evening. 70 g 1    pantoprazole (PROTONIX) 40 MG tablet       sertraline (ZOLOFT) 50 MG tablet sertraline 50 mg tablet      metroNIDAZOLE (METROGEL) 0.75 % vaginal gel Place 1 applicator vaginally 2 (two) times daily. (Patient not taking: Reported on 3/17/2022) 70 g 0    terconazole (TERAZOL 3) 0.8 % vaginal cream Place 1 applicator vaginally every evening.  (Patient not taking: No sig reported) 20 g 0     No current facility-administered medications for this visit.       Review of patient's allergies indicates:   Allergen Reactions    No known drug allergies        Review of System:   General: no chills, fever, night sweats, weight gain or weight loss  Psychological: no depression or suicidal ideation  Breasts: no new or changing breast lumps, nipple discharge or masses.  Respiratory: no cough, shortness of breath, or wheezing  Cardiovascular: no chest pain or dyspnea on exertion  Gastrointestinal: no abdominal pain, change in bowel habits, or black or bloody stools  Genito-Urinary: no incontinence, urinary frequency/urgency or vulvar/vaginal symptoms, pelvic pain or abnormal vaginal bleeding.  Musculoskeletal: no gait disturbance or muscular weakness                                              General Appearance    A and O x 4, Cooperative, no distress   Breasts    Abdomen   Symmetrical, no masses, no discharge, skin changes , erythema or retraction. No adenopathy  Soft, non-tender, bowel sounds active all four quadrants,  no masses, no organomegaly    Genitourinary:   External rectal exam shows no thrombosed external hemorrhoids.   Pelvic exam was performed with patient supine.  No labial fusion.  There is no rash, lesion or injury on the right labia.  There is no rash, lesion or injury on the left labia.  No bleeding and no signs of injury around the vaginal introitus, urethra is without lesions and well supported. The cervix is visualized with no discharge, lesions or friability.  No vaginal discharge found.  No significant Cystocele, Enterocele or rectocele, and uterus well supported.  Bimanual exam:  The urethra is normal to palpation and there are no palpable vaginal wall masses.  Uterus is not deviated, not enlarged, not fixed, normal shape and not tender.  Cervix exhibits no motion tenderness.   Right adnexum displays no mass and no tenderness.  Left adnexum  displays no mass and no tenderness.   Extremities: Extremities normal, atraumatic, no cyanosis or edema                     NOTE  NURSING PERSONAL PRESENT FOR ENTIRE PHYSICAL EXAM      PAP submitted    Will submi urine for culture  BSE monthly  RTO 1 year/prn

## 2022-03-20 LAB — BACTERIA UR CULT: ABNORMAL

## 2022-04-21 ENCOUNTER — TELEPHONE (OUTPATIENT)
Dept: OBSTETRICS AND GYNECOLOGY | Facility: CLINIC | Age: 37
End: 2022-04-21
Payer: COMMERCIAL

## 2022-04-21 NOTE — TELEPHONE ENCOUNTER
----- Message from Tami Cho sent at 4/21/2022  1:39 PM CDT -----  Regarding: advice  Contact: Patient/[phone]  Type:  RX Refill Request    Who Called:  Patient/Patient/[phone]      Preferred Pharmacy with phone number:    [id number] - [place] - [address]  [address] [address]  Phone: [phone] Fax: [phone]     Local or Mail Order:  local  Ordering Provider:  same    Additional Information:  Patient is asking if the office can add enough refill to the end of the year. She did  the ones that was filled on 4/14/22 but needs the refills added on.Please call to advise.

## 2022-04-21 NOTE — TELEPHONE ENCOUNTER
Pt would like enough refills of miguel sent to the pharmacy for the remainder of the year. She states she did  the prescription sent in on 4/14/2022.

## 2022-04-25 RX ORDER — NORETHINDRONE ACETATE AND ETHINYL ESTRADIOL .02; 1 MG/1; MG/1
1 TABLET ORAL DAILY
Qty: 21 TABLET | Refills: 7 | Status: SHIPPED | OUTPATIENT
Start: 2022-04-25 | End: 2022-11-15 | Stop reason: SDUPTHER

## 2022-07-03 NOTE — TELEPHONE ENCOUNTER
----- Message from Misbah Weinstein sent at 9/12/2018  9:57 AM CDT -----  Type: Needs Medical Advice    Who Called:  Self Symptoms (please be specific):  JEFFRYjudy long has patient had these symptoms:  [facility] name and phone #:  [facility]  Best Call Back Number: [phone] Additional Information:      English

## 2023-06-15 ENCOUNTER — OFFICE VISIT (OUTPATIENT)
Dept: OBSTETRICS AND GYNECOLOGY | Facility: CLINIC | Age: 38
End: 2023-06-15
Payer: COMMERCIAL

## 2023-06-15 VITALS
HEIGHT: 64 IN | SYSTOLIC BLOOD PRESSURE: 122 MMHG | BODY MASS INDEX: 28.12 KG/M2 | DIASTOLIC BLOOD PRESSURE: 74 MMHG | WEIGHT: 164.69 LBS

## 2023-06-15 DIAGNOSIS — Z12.4 ENCOUNTER FOR PAP SMEAR OF CERVIX WITH HPV DNA COTESTING: Primary | ICD-10-CM

## 2023-06-15 PROCEDURE — 99999 PR PBB SHADOW E&M-EST. PATIENT-LVL III: CPT | Mod: PBBFAC,,, | Performed by: SPECIALIST

## 2023-06-15 PROCEDURE — 3078F DIAST BP <80 MM HG: CPT | Mod: CPTII,S$GLB,, | Performed by: SPECIALIST

## 2023-06-15 PROCEDURE — 1159F PR MEDICATION LIST DOCUMENTED IN MEDICAL RECORD: ICD-10-PCS | Mod: CPTII,S$GLB,, | Performed by: SPECIALIST

## 2023-06-15 PROCEDURE — 88175 CYTOPATH C/V AUTO FLUID REDO: CPT | Performed by: SPECIALIST

## 2023-06-15 PROCEDURE — 3074F PR MOST RECENT SYSTOLIC BLOOD PRESSURE < 130 MM HG: ICD-10-PCS | Mod: CPTII,S$GLB,, | Performed by: SPECIALIST

## 2023-06-15 PROCEDURE — 1159F MED LIST DOCD IN RCRD: CPT | Mod: CPTII,S$GLB,, | Performed by: SPECIALIST

## 2023-06-15 PROCEDURE — 99999 PR PBB SHADOW E&M-EST. PATIENT-LVL III: ICD-10-PCS | Mod: PBBFAC,,, | Performed by: SPECIALIST

## 2023-06-15 PROCEDURE — 3078F PR MOST RECENT DIASTOLIC BLOOD PRESSURE < 80 MM HG: ICD-10-PCS | Mod: CPTII,S$GLB,, | Performed by: SPECIALIST

## 2023-06-15 PROCEDURE — 3008F BODY MASS INDEX DOCD: CPT | Mod: CPTII,S$GLB,, | Performed by: SPECIALIST

## 2023-06-15 PROCEDURE — 99213 PR OFFICE/OUTPT VISIT, EST, LEVL III, 20-29 MIN: ICD-10-PCS | Mod: S$GLB,,, | Performed by: SPECIALIST

## 2023-06-15 PROCEDURE — 3074F SYST BP LT 130 MM HG: CPT | Mod: CPTII,S$GLB,, | Performed by: SPECIALIST

## 2023-06-15 PROCEDURE — 99213 OFFICE O/P EST LOW 20 MIN: CPT | Mod: S$GLB,,, | Performed by: SPECIALIST

## 2023-06-15 PROCEDURE — 87624 HPV HI-RISK TYP POOLED RSLT: CPT | Performed by: SPECIALIST

## 2023-06-15 PROCEDURE — 3008F PR BODY MASS INDEX (BMI) DOCUMENTED: ICD-10-PCS | Mod: CPTII,S$GLB,, | Performed by: SPECIALIST

## 2023-06-15 NOTE — PROGRESS NOTES
38 yo BF presents for annual gyn eval Screening mammogram due age 38 in August  Past Medical History:   Diagnosis Date    Abnormal Pap smear     cryo    Abnormal Pap smear of vagina     cryo    Constipation 2011       Past Surgical History:   Procedure Laterality Date    CRYOTHERAPY      TUBAL LIGATION         Family History   Problem Relation Age of Onset    Other Father     Multiple myeloma Father          in 60s    Hypertension Mother     Diabetes type II Brother     Breast cancer Neg Hx     Ovarian cancer Neg Hx        Social History     Socioeconomic History    Marital status: Single   Tobacco Use    Smoking status: Never    Smokeless tobacco: Never   Substance and Sexual Activity    Alcohol use: Yes     Comment: 1 glass per night.     Drug use: No    Sexual activity: Yes     Partners: Male     Birth control/protection: Surgical       Current Outpatient Medications   Medication Sig Dispense Refill    ALPRAZolam (XANAX) 0.25 MG tablet Take 1 tablet (0.25 mg total) by mouth 3 (three) times daily as needed for Insomnia. 1 Tablet(s) Oral PRN Every 8 hours. 60 tablet 0    amoxicillin (AMOXIL) 875 MG tablet       dextroamphetamine-amphetamine (ADDERALL) 15 mg tablet dextroamphetamine-amphetamine 15 mg tablet      hydrOXYzine (VISTARIL) 100 MG capsule hydroxyzine pamoate 100 mg capsule      metroNIDAZOLE (METROGEL) 0.75 % vaginal gel Place 1 applicator vaginally every evening. 70 g 1    metroNIDAZOLE (METROGEL) 0.75 % vaginal gel Place 1 applicator vaginally 2 (two) times daily. 70 g 0    norethindrone-ethinyl estradiol (MARC , ,) 1-20 mg-mcg per tablet Take 1 tablet by mouth once daily. 21 tablet 7    pantoprazole (PROTONIX) 40 MG tablet       sertraline (ZOLOFT) 50 MG tablet sertraline 50 mg tablet      terconazole (TERAZOL 3) 0.8 % vaginal cream Place 1 applicator vaginally every evening. 20 g 0     No current facility-administered medications for this visit.       Review of patient's allergies  indicates:   Allergen Reactions    No known drug allergies        Review of System:   General: no chills, fever, night sweats, weight gain or weight loss  Psychological: no depression or suicidal ideation  Breasts: no new or changing breast lumps, nipple discharge or masses.  Respiratory: no cough, shortness of breath, or wheezing  Cardiovascular: no chest pain or dyspnea on exertion  Gastrointestinal: no abdominal pain, change in bowel habits, or black or bloody stools  Genito-Urinary: no incontinence, urinary frequency/urgency or vulvar/vaginal symptoms, pelvic pain or abnormal vaginal bleeding.  Musculoskeletal: no gait disturbance or muscular weakness                                               General Appearance    A and O x 4, Cooperative, no distress   Breasts    Abdomen   Symmetrical, no masses, no discharge, skin changes , erythema or retraction. No adenopathy  Soft, non-tender, bowel sounds active all four quadrants,  no masses, no organomegaly    Genitourinary:   External rectal exam shows no thrombosed external hemorrhoids.   Pelvic exam was performed with patient supine.  No labial fusion.  There is no rash, lesion or injury on the right labia.  There is no rash, lesion or injury on the left labia.  No bleeding and no signs of injury around the vaginal introitus, urethra is without lesions and well supported. The cervix is visualized with no discharge, lesions or friability.  No vaginal discharge found.  No significant Cystocele, Enterocele or rectocele, and uterus well supported.  Bimanual exam:  The urethra is normal to palpation and there are no palpable vaginal wall masses.  Uterus is not deviated, not enlarged, not fixed, normal shape and not tender.  Cervix exhibits no motion tenderness.   Right adnexum displays no mass and no tenderness.  Left adnexum displays no mass and no tenderness.   Extremities: Extremities normal, atraumatic, no cyanosis or edema                     NOTE  NURSING  PERSONAL PRESENT FOR ENTIRE PHYSICAL EXAM     PAP submitted    Plan  BSE monthly  Screening mammogram in August  RTO 2 years/prn    I spent a total of 30 minutes on the day of the visit. This includes face to face time and non-face to face time preparing to see the patient (eg, review of tests), obtaining and/or reviewing separately obtained history, documenting clinical information in the electronic or other health record, independently interpreting results and communicating results to the patient/family/caregiver, or care coordinator.

## 2023-06-22 LAB
CLINICAL INFO: NORMAL
CYTO CVX: NORMAL
CYTOLOGIST CVX/VAG CYTO: NORMAL
CYTOLOGIST CVX/VAG CYTO: NORMAL
CYTOLOGY CMNT CVX/VAG CYTO-IMP: NORMAL
CYTOLOGY PAP THIN PREP EXPLANATION: NORMAL
DATE OF PREVIOUS PAP: YES
DATE PREVIOUS BX: YES
GEN CATEG CVX/VAG CYTO-IMP: NORMAL
HPV I/H RISK 4 DNA CVX QL NAA+PROBE: NOT DETECTED
LMP START DATE: NORMAL
MICROORGANISM CVX/VAG CYTO: NORMAL
PATHOLOGIST CVX/VAG CYTO: NORMAL
SERVICE CMNT-IMP: NORMAL
SPECIMEN SOURCE CVX/VAG CYTO: NORMAL
STAT OF ADQ CVX/VAG CYTO-IMP: NORMAL

## 2023-07-13 RX ORDER — METRONIDAZOLE 7.5 MG/G
1 GEL VAGINAL 2 TIMES DAILY
Qty: 70 G | Refills: 0 | Status: SHIPPED | OUTPATIENT
Start: 2023-07-13

## 2023-08-22 ENCOUNTER — HOSPITAL ENCOUNTER (OUTPATIENT)
Dept: RADIOLOGY | Facility: HOSPITAL | Age: 38
Discharge: HOME | End: 2023-08-22
Attending: FAMILY MEDICINE
Payer: COMMERCIAL

## 2023-08-22 DIAGNOSIS — Z12.31 SCREENING MAMMOGRAM FOR BREAST CANCER: ICD-10-CM

## 2023-08-22 PROCEDURE — 77067 SCR MAMMO BI INCL CAD: CPT | Mod: TC,PO

## 2023-08-22 PROCEDURE — 77067 SCR MAMMO BI INCL CAD: CPT | Mod: 26,,, | Performed by: RADIOLOGY

## 2023-08-22 PROCEDURE — 77067 MAMMO DIGITAL SCREENING BILAT WITH TOMO: ICD-10-PCS | Mod: 26,,, | Performed by: RADIOLOGY

## 2023-08-22 PROCEDURE — 77063 BREAST TOMOSYNTHESIS BI: CPT | Mod: 26,,, | Performed by: RADIOLOGY

## 2023-08-22 PROCEDURE — 77063 MAMMO DIGITAL SCREENING BILAT WITH TOMO: ICD-10-PCS | Mod: 26,,, | Performed by: RADIOLOGY

## 2024-05-07 ENCOUNTER — PATIENT MESSAGE (OUTPATIENT)
Dept: GASTROENTEROLOGY | Facility: CLINIC | Age: 39
End: 2024-05-07

## 2024-05-07 ENCOUNTER — OFFICE VISIT (OUTPATIENT)
Dept: GASTROENTEROLOGY | Facility: CLINIC | Age: 39
End: 2024-05-07
Payer: COMMERCIAL

## 2024-05-07 DIAGNOSIS — R14.2 BURPING: ICD-10-CM

## 2024-05-07 DIAGNOSIS — K59.04 CHRONIC IDIOPATHIC CONSTIPATION: Primary | ICD-10-CM

## 2024-05-07 PROCEDURE — 99204 OFFICE O/P NEW MOD 45 MIN: CPT | Mod: 95,,, | Performed by: INTERNAL MEDICINE

## 2024-05-07 PROCEDURE — 1160F RVW MEDS BY RX/DR IN RCRD: CPT | Mod: CPTII,95,, | Performed by: INTERNAL MEDICINE

## 2024-05-07 PROCEDURE — 1159F MED LIST DOCD IN RCRD: CPT | Mod: CPTII,95,, | Performed by: INTERNAL MEDICINE

## 2024-05-07 RX ORDER — POLYETHYLENE GLYCOL 3350 17 G/17G
17 POWDER, FOR SOLUTION ORAL 2 TIMES DAILY
Qty: 180 EACH | Refills: 0 | Status: SHIPPED | OUTPATIENT
Start: 2024-05-07 | End: 2024-08-05

## 2024-05-07 RX ORDER — OMEPRAZOLE 40 MG/1
40 CAPSULE, DELAYED RELEASE ORAL DAILY
Qty: 90 CAPSULE | Refills: 0 | Status: SHIPPED | OUTPATIENT
Start: 2024-05-07 | End: 2024-08-05

## 2024-05-07 NOTE — PROGRESS NOTES
Clinic Consult:  [facility] Consultation Note    Reason for Consult:  The primary encounter diagnosis was Chronic idiopathic constipation. A diagnosis of Burping was also pertinent to this visit.    PCP: No, Primary Doctor   No address on file      20 minutes of total time spent on the encounter, which includes face to face time and non-face to face time preparing to see the patient (eg, review of tests), Obtaining and/or reviewing separately obtained history, Documenting clinical information in the electronic or other health record, Independently interpreting results (not separately reported) and communicating results to the patient/family/caregiver, or Care coordination (not separately reported).   Each patient to whom he or she provides medical services by telemedicine is: (1) informed of the relationship between the physician and patient and the respective role of any other health care provider with respect to management of the patient; and (2) notified that he or she may decline to receive medical services by telemedicine and may withdraw from such care at any time.      HPI:  This is a 38 y.o. female here for evaluation of the following:     //Burping   This has been occurring intermittently for year.   She had an EGD and was told that she had H. Pylori   She is s/p treatment   Drinking carbonation helps.  Took Protonix in the past but it did not help.      //Constipation   -This has been occurring for a while   -She is having a bowel movement once a week  -She is taking laxatives.   -She tries to drink 3 bottles of water a day   -Endorses a low fiber diet       ROS:  As per HPI       Medical History:   Past Medical History:   Diagnosis Date    Abnormal Pap smear     cryo    Abnormal Pap smear of vagina     cryo    Constipation 12/20/2011       Surgical History:  Past Surgical History:   Procedure Laterality Date    CRYOTHERAPY      TUBAL LIGATION         Family History:   Family History    Problem Relation Name Age of Onset    Other Father      Multiple myeloma Father           in 60s    Hypertension Mother      Diabetes type II Brother      Breast cancer Neg Hx      Ovarian cancer Neg Hx         Social History:   Social History     Tobacco Use    Smoking status: Never    Smokeless tobacco: Never   Substance Use Topics    Alcohol use: Yes     Comment: 1 glass per night.     Drug use: No       Allergies: Reviewed    Home Medications:   Medication List with Changes/Refills   New Medications    OMEPRAZOLE (PRILOSEC) 40 MG CAPSULE    Take 1 capsule (40 mg total) by mouth once daily.    POLYETHYLENE GLYCOL (GLYCOLAX) 17 GRAM PWPK    Take 17 g by mouth 2 (two) times daily.   Current Medications    ALPRAZOLAM (XANAX) 0.25 MG TABLET    Take 1 tablet (0.25 mg total) by mouth 3 (three) times daily as needed for Insomnia. 1 Tablet(s) Oral PRN Every 8 hours.    AMOXICILLIN (AMOXIL) 875 MG TABLET        DEXTROAMPHETAMINE-AMPHETAMINE (ADDERALL) 15 MG TABLET    dextroamphetamine-amphetamine 15 mg tablet    HYDROXYZINE (VISTARIL) 100 MG CAPSULE    hydroxyzine pamoate 100 mg capsule    METRONIDAZOLE (METROGEL) 0.75 % (37.5MG/5 GRAM) VAGINAL GEL    Place 1 applicator vaginally 2 (two) times daily.    METRONIDAZOLE (METROGEL) 0.75 % VAGINAL GEL    Place 1 applicator vaginally every evening.    NORETHINDRONE-ETHINYL ESTRADIOL (MARC , ,) 1-20 MG-MCG PER TABLET    Take 1 tablet by mouth once daily.    PANTOPRAZOLE (PROTONIX) 40 MG TABLET        SERTRALINE (ZOLOFT) 50 MG TABLET    sertraline 50 mg tablet    TERCONAZOLE (TERAZOL 3) 0.8 % VAGINAL CREAM    Place 1 applicator vaginally every evening.         Physical Exam:  Vital Signs:  There were no vitals taken for this visit.  There is no height or weight on file to calculate BMI.    Physical Exam  Vitals reviewed: virtual visit.          Labs: Pertinent labs reviewed.        Assessment:  Problem List Items Addressed This Visit          GI    Chronic idiopathic  constipation - Primary    Current Assessment & Plan     Plan:   -Miralax PO BID   -Drink at least 64 ounces of water a day   -Consume at least 20g of fiber a day          Burping    Current Assessment & Plan     Plan:   -Suspect this is secondary to GERD.   -Will start on Omeprazole PO daily   -GERD lifestyle modification           Chronic idiopathic constipation  -     X-Ray Abdomen Flat And Erect; Future; Expected date: 05/07/2024  -     TSH; Future; Expected date: 05/07/2024  -     polyethylene glycol (GLYCOLAX) 17 gram PwPk; Take 17 g by mouth 2 (two) times daily.  Dispense: 180 each; Refill: 0    Burping    Other orders  -     omeprazole (PRILOSEC) 40 MG capsule; Take 1 capsule (40 mg total) by mouth once daily.  Dispense: 90 capsule; Refill: 0                Follow up in about 3 months (around 8/7/2024).    Order summary:  Orders Placed This Encounter   Procedures    X-Ray Abdomen Flat And Erect    TSH       Thank you so much for allowing me to participate in the care of Treasure Juan Joséjacksonmaritza Wilburn MD  Gastroenterology and Hepatology  [facility]

## 2024-05-07 NOTE — ASSESSMENT & PLAN NOTE
Plan:   -Miralax PO BID   -Drink at least 64 ounces of water a day   -Consume at least 20g of fiber a day

## 2024-05-07 NOTE — ASSESSMENT & PLAN NOTE
Plan:   -Suspect this is secondary to GERD.   -Will start on Omeprazole PO daily   -GERD lifestyle modification

## 2024-05-09 ENCOUNTER — HOSPITAL ENCOUNTER (OUTPATIENT)
Dept: RADIOLOGY | Facility: HOSPITAL | Age: 39
Discharge: HOME | End: 2024-05-09
Attending: INTERNAL MEDICINE
Payer: COMMERCIAL

## 2024-05-09 DIAGNOSIS — K59.04 CHRONIC IDIOPATHIC CONSTIPATION: ICD-10-CM

## 2024-05-09 PROCEDURE — 74019 RADEX ABDOMEN 2 VIEWS: CPT | Mod: TC,PO

## 2024-05-09 PROCEDURE — 74019 RADEX ABDOMEN 2 VIEWS: CPT | Mod: 26,,, | Performed by: RADIOLOGY

## 2024-07-19 ENCOUNTER — TELEPHONE (OUTPATIENT)
Dept: OBSTETRICS AND GYNECOLOGY | Facility: CLINIC | Age: 39
End: 2024-07-19
Payer: COMMERCIAL

## 2024-07-19 NOTE — TELEPHONE ENCOUNTER
Patient reports having BV symptoms, used the monistat 3 day and it did not help. Requesting refill of Metrogel. Advised patient since its been over a year since last visit would need to be seen. Patient scheduled for Monday at 2:20pm with Dr. Briscoe

## 2024-07-19 NOTE — TELEPHONE ENCOUNTER
----- Message from Angi Singh sent at 7/19/2024 10:22 AM CDT -----  Contact: pt  Type: Needs Medical Advice         Who Called: pt  Best Call Back Number:[phone]  Additional Information: Requesting a call back regarding  pt is following up on the request for refill for the metroNIDAZOLE (METROGEL) 0.75 % (37.5mg/5 gram) vaginal gel.      [id number]  [place] - [address] [address]  Phone: [phone] Fax: [phone]      Please Advise- Thank you

## 2024-07-22 ENCOUNTER — OFFICE VISIT (OUTPATIENT)
Dept: OBSTETRICS AND GYNECOLOGY | Facility: CLINIC | Age: 39
End: 2024-07-22
Payer: COMMERCIAL

## 2024-07-22 VITALS
SYSTOLIC BLOOD PRESSURE: 122 MMHG | BODY MASS INDEX: 28.42 KG/M2 | WEIGHT: 165.56 LBS | DIASTOLIC BLOOD PRESSURE: 68 MMHG

## 2024-07-22 DIAGNOSIS — N89.8 VAGINAL DISCHARGE: Primary | ICD-10-CM

## 2024-07-22 DIAGNOSIS — B96.89 BV (BACTERIAL VAGINOSIS): ICD-10-CM

## 2024-07-22 DIAGNOSIS — Z11.3 SCREEN FOR STD (SEXUALLY TRANSMITTED DISEASE): ICD-10-CM

## 2024-07-22 DIAGNOSIS — N76.0 BV (BACTERIAL VAGINOSIS): ICD-10-CM

## 2024-07-22 PROCEDURE — 3078F DIAST BP <80 MM HG: CPT | Mod: CPTII,S$GLB,, | Performed by: SPECIALIST

## 2024-07-22 PROCEDURE — 3074F SYST BP LT 130 MM HG: CPT | Mod: CPTII,S$GLB,, | Performed by: SPECIALIST

## 2024-07-22 PROCEDURE — 81514 NFCT DS BV&VAGINITIS DNA ALG: CPT | Performed by: SPECIALIST

## 2024-07-22 PROCEDURE — 1159F MED LIST DOCD IN RCRD: CPT | Mod: CPTII,S$GLB,, | Performed by: SPECIALIST

## 2024-07-22 PROCEDURE — 87491 CHLMYD TRACH DNA AMP PROBE: CPT | Performed by: SPECIALIST

## 2024-07-22 PROCEDURE — 99213 OFFICE O/P EST LOW 20 MIN: CPT | Mod: S$GLB,,, | Performed by: SPECIALIST

## 2024-07-22 PROCEDURE — 87591 N.GONORRHOEAE DNA AMP PROB: CPT | Performed by: SPECIALIST

## 2024-07-22 PROCEDURE — 3008F BODY MASS INDEX DOCD: CPT | Mod: CPTII,S$GLB,, | Performed by: SPECIALIST

## 2024-07-22 PROCEDURE — 99999 PR PBB SHADOW E&M-EST. PATIENT-LVL III: CPT | Mod: PBBFAC,,, | Performed by: SPECIALIST

## 2024-07-22 RX ORDER — METRONIDAZOLE 250 MG/1
250 TABLET ORAL EVERY 8 HOURS
Qty: 21 TABLET | Refills: 1 | Status: SHIPPED | OUTPATIENT
Start: 2024-07-22

## 2024-07-22 RX ORDER — VENLAFAXINE 25 MG/1
25 TABLET ORAL 2 TIMES DAILY
COMMUNITY

## 2024-07-22 NOTE — PROGRESS NOTES
37 yo BF presents for eval vaginal discharge with associaited odor Pt denies PP, dysuria, hematuria, n/v.  Past Medical History:   Diagnosis Date    Abnormal Pap smear     cryo    Abnormal Pap smear of vagina     cryo    Constipation 2011       Past Surgical History:   Procedure Laterality Date    CRYOTHERAPY      TUBAL LIGATION         Family History   Problem Relation Name Age of Onset    Other Father      Multiple myeloma Father           in 60s    Hypertension Mother      Diabetes type II Brother      Breast cancer Neg Hx      Ovarian cancer Neg Hx         Social History     Socioeconomic History    Marital status: Single   Tobacco Use    Smoking status: Never    Smokeless tobacco: Never   Substance and Sexual Activity    Alcohol use: Yes     Comment: 1 glass per night.     Drug use: No    Sexual activity: Yes     Partners: Male     Birth control/protection: Surgical       Current Outpatient Medications   Medication Sig Dispense Refill    ALPRAZolam (XANAX) 0.25 MG tablet Take 1 tablet (0.25 mg total) by mouth 3 (three) times daily as needed for Insomnia. 1 Tablet(s) Oral PRN Every 8 hours. 60 tablet 0    dextroamphetamine-amphetamine (ADDERALL) 15 mg tablet dextroamphetamine-amphetamine 15 mg tablet      omeprazole (PRILOSEC) 40 MG capsule Take 1 capsule (40 mg total) by mouth once daily. 90 capsule 0    pantoprazole (PROTONIX) 40 MG tablet       polyethylene glycol (GLYCOLAX) 17 gram PwPk Take 17 g by mouth 2 (two) times daily. 180 each 0    venlafaxine (EFFEXOR) 25 MG Tab Take 25 mg by mouth 2 (two) times daily.      amoxicillin (AMOXIL) 875 MG tablet       hydrOXYzine (VISTARIL) 100 MG capsule hydroxyzine pamoate 100 mg capsule (Patient not taking: Reported on 2024)      metroNIDAZOLE (FLAGYL) 250 MG tablet Take 1 tablet (250 mg total) by mouth every 8 (eight) hours. 21 tablet 1    metroNIDAZOLE (METROGEL) 0.75 % (37.5mg/5 gram) vaginal gel Place 1 applicator vaginally 2 (two) times daily.  (Patient not taking: Reported on 7/22/2024) 70 g 0    metroNIDAZOLE (METROGEL) 0.75 % vaginal gel Place 1 applicator vaginally every evening. (Patient not taking: Reported on 7/22/2024) 70 g 1    norethindrone-ethinyl estradiol (MARC 1/20, 21,) 1-20 mg-mcg per tablet Take 1 tablet by mouth once daily. 21 tablet 7    sertraline (ZOLOFT) 50 MG tablet sertraline 50 mg tablet (Patient not taking: Reported on 7/22/2024)      terconazole (TERAZOL 3) 0.8 % vaginal cream Place 1 applicator vaginally every evening. (Patient not taking: Reported on 7/22/2024) 20 g 0     No current facility-administered medications for this visit.       Review of patient's allergies indicates:   Allergen Reactions    No known drug allergies        Review of System:   General: no chills, fever, night sweats, weight gain or weight loss  Psychological: no depression or suicidal ideation  Breasts: no new or changing breast lumps, nipple discharge or masses.  Respiratory: no cough, shortness of breath, or wheezing  Cardiovascular: no chest pain or dyspnea on exertion  Gastrointestinal: no abdominal pain, change in bowel habits, or black or bloody stools  Genito-Urinary: no incontinence, urinary frequency/urgency or vulvar/vaginal symptoms, pelvic pain or abnormal vaginal bleeding.  Musculoskeletal: no gait disturbance or muscular weakness                                               General Appearance    A and O x 4, Cooperative, no distress   Breasts    Abdomen   deferred  Soft, non-tender, bowel sounds active all four quadrants,  no masses, no organomegaly    Genitourinary:   External rectal exam shows no thrombosed external hemorrhoids.   Pelvic exam was performed with patient supine.  No labial fusion.  There is no rash, lesion or injury on the right labia.  There is no rash, lesion or injury on the left labia.  No bleeding and no signs of injury around the vaginal introitus, urethra is without lesions and well supported. The cervix is  visualized with no discharge, lesions or friability.  Vagina grey frothy d/c positive whiff  No significant Cystocele, Enterocele or rectocele, and uterus well supported.  Bimanual exam:  The urethra is normal to palpation and there are no palpable vaginal wall masses.  Uterus is not deviated, not enlarged, not fixed, normal shape and not tender.  Cervix exhibits no motion tenderness.   Right adnexum displays no mass and no tenderness.  Left adnexum displays no mass and no tenderness.   Extremities: Extremities normal, atraumatic, no cyanosis or edema                     NOTE  NURSING PERSONAL PRESENT FOR ENTIRE PHYSICAL EXAM     AFFIRM, cervical cultures submitted  Discussed clinical BV and will terat empirically  Answered all questions  Pt to contact me if not improved    I spent a total of 30 minutes on the day of the visit. This includes face to face time and non-face to face time preparing to see the patient (eg, review of tests), obtaining and/or reviewing separately obtained history, documenting clinical information in the electronic or other health record, independently interpreting results and communicating results to the patient/family/caregiver, or care coordinator.

## 2024-07-23 RX ORDER — METRONIDAZOLE 7.5 MG/G
1 GEL VAGINAL 2 TIMES DAILY
Qty: 70 G | Refills: 0 | OUTPATIENT
Start: 2024-07-23

## 2024-07-23 RX ORDER — METRONIDAZOLE 7.5 MG/G
1 GEL VAGINAL 2 TIMES DAILY
Qty: 70 G | Refills: 0 | Status: SHIPPED | OUTPATIENT
Start: 2024-07-23

## 2024-07-23 NOTE — TELEPHONE ENCOUNTER
Patient called abck and said the pill was sent in but it makes her sick so she would rather have the Gel sent in like she has had before.     metroNIDAZOLE (METROGEL) 0.75 % (37.5mg/5 gram) vaginal gel     Please advise

## 2024-07-23 NOTE — TELEPHONE ENCOUNTER
----- Message from Guera Hairston sent at 7/23/2024  2:00 PM CDT -----  Type: Needs Medical Advice  Who Called:  pt  Pharmacy name and phone #:    [id number] - [place] - [address]  [address]  [place] [address]  Phone: [phone] Fax: [phone]  Best Call Back Number: [phone]  Additional Information: pt is calling the office in regards to getting metroNIDAZOLE (METROGEL) 0.75 % (37.5mg/5 gram) vaginal gel sent in asap today. Please call back to advise. Thanks!

## 2024-07-23 NOTE — TELEPHONE ENCOUNTER
"LVM to return my call and confirm RX. Per call room, "pt is calling the office in regards to getting metroNIDAZOLE (METROGEL) 0.75 % (37.5mg/5 gram) vaginal gel sent in asap today. Please call back to advise. Thanks!"  "

## 2024-07-24 LAB
BACTERIAL VAGINOSIS DNA: POSITIVE
C TRACH DNA SPEC QL NAA+PROBE: NOT DETECTED
CANDIDA GLABRATA DNA: NEGATIVE
CANDIDA KRUSEI DNA: NEGATIVE
CANDIDA RRNA VAG QL PROBE: NEGATIVE
N GONORRHOEA DNA SPEC QL NAA+PROBE: NOT DETECTED
T VAGINALIS RRNA GENITAL QL PROBE: NEGATIVE

## 2024-08-20 ENCOUNTER — TELEPHONE (OUTPATIENT)
Dept: OBSTETRICS AND GYNECOLOGY | Facility: CLINIC | Age: 39
End: 2024-08-20
Payer: COMMERCIAL

## 2024-08-20 DIAGNOSIS — Z12.31 VISIT FOR SCREENING MAMMOGRAM: Primary | ICD-10-CM

## 2024-08-20 NOTE — TELEPHONE ENCOUNTER
----- Message from Briseida Francis sent at 8/20/2024  1:54 PM CDT -----  Contact: self  Type:  Needs Medical Advice    Who Called: Treasure   Symptoms (please be specific): mammo orders    Would the patient rather a call back or a response via [facility]? Call back   Best Call Back Number: [phone]   Additional Information: pt is calling requesting mammo orders

## 2024-09-05 ENCOUNTER — OFFICE VISIT (OUTPATIENT)
Dept: OBSTETRICS AND GYNECOLOGY | Facility: CLINIC | Age: 39
End: 2024-09-05
Payer: COMMERCIAL

## 2024-09-05 ENCOUNTER — HOSPITAL ENCOUNTER (OUTPATIENT)
Dept: RADIOLOGY | Facility: HOSPITAL | Age: 39
Discharge: HOME | End: 2024-09-05
Attending: SPECIALIST
Payer: COMMERCIAL

## 2024-09-05 VITALS
SYSTOLIC BLOOD PRESSURE: 114 MMHG | DIASTOLIC BLOOD PRESSURE: 82 MMHG | WEIGHT: 160.94 LBS | BODY MASS INDEX: 27.62 KG/M2

## 2024-09-05 DIAGNOSIS — Z12.4 SCREENING FOR CERVICAL CANCER: ICD-10-CM

## 2024-09-05 DIAGNOSIS — Z01.411 ENCOUNTER FOR GYNECOLOGICAL EXAMINATION WITH ABNORMAL FINDING: Primary | ICD-10-CM

## 2024-09-05 DIAGNOSIS — R82.90 ABNORMAL URINE ODOR: ICD-10-CM

## 2024-09-05 DIAGNOSIS — Z12.31 VISIT FOR SCREENING MAMMOGRAM: ICD-10-CM

## 2024-09-05 DIAGNOSIS — F41.9 ANXIETY: ICD-10-CM

## 2024-09-05 LAB
BILIRUBIN, UA POC OHS: NEGATIVE
BLOOD, UA POC OHS: NEGATIVE
CLARITY, UA POC OHS: CLEAR
COLOR, UA POC OHS: YELLOW
GLUCOSE, UA POC OHS: NEGATIVE
KETONES, UA POC OHS: NEGATIVE
LEUKOCYTES, UA POC OHS: ABNORMAL
NITRITE, UA POC OHS: NEGATIVE
PH, UA POC OHS: 7
PROTEIN, UA POC OHS: NEGATIVE
SPECIFIC GRAVITY, UA POC OHS: 1.02
UROBILINOGEN, UA POC OHS: 1

## 2024-09-05 PROCEDURE — 99999 PR PBB SHADOW E&M-EST. PATIENT-LVL IV: CPT | Mod: PBBFAC,,, | Performed by: SPECIALIST

## 2024-09-05 PROCEDURE — 77067 SCR MAMMO BI INCL CAD: CPT | Mod: 26,,, | Performed by: RADIOLOGY

## 2024-09-05 PROCEDURE — 77067 SCR MAMMO BI INCL CAD: CPT | Mod: TC,PN

## 2024-09-05 PROCEDURE — 77063 BREAST TOMOSYNTHESIS BI: CPT | Mod: 26,,, | Performed by: RADIOLOGY

## 2024-09-05 RX ORDER — ALPRAZOLAM 0.25 MG/1
0.25 TABLET ORAL 2 TIMES DAILY PRN
Qty: 30 TABLET | Refills: 0 | Status: SHIPPED | OUTPATIENT
Start: 2024-09-05

## 2024-09-05 NOTE — PROGRESS NOTES
40 yo BF presents for annual gyn eval Pt with no overt gyn complaints with exception of occasional urinary malodor denies JULIETH, UI, dysuria, PP or flank pain  Past Medical History:   Diagnosis Date    Abnormal Pap smear     cryo    Abnormal Pap smear of vagina     cryo    Constipation 2011       Past Surgical History:   Procedure Laterality Date    CRYOTHERAPY      TUBAL LIGATION         Family History   Problem Relation Name Age of Onset    Other Father      Multiple myeloma Father           in 60s    Hypertension Mother      Diabetes type II Brother      Breast cancer Neg Hx      Ovarian cancer Neg Hx         Social History     Socioeconomic History    Marital status: Single   Tobacco Use    Smoking status: Never    Smokeless tobacco: Never   Substance and Sexual Activity    Alcohol use: Yes     Comment: 1 glass per night.     Drug use: No    Sexual activity: Yes     Partners: Male     Birth control/protection: Surgical       Current Outpatient Medications   Medication Sig Dispense Refill    dextroamphetamine-amphetamine (ADDERALL) 15 mg tablet 20 mg.      metroNIDAZOLE (FLAGYL) 250 MG tablet Take 1 tablet (250 mg total) by mouth every 8 (eight) hours. 21 tablet 1    metroNIDAZOLE (METROGEL) 0.75 % (37.5mg/5 gram) vaginal gel Place 1 applicator vaginally 2 (two) times daily. 70 g 0    pantoprazole (PROTONIX) 40 MG tablet       venlafaxine (EFFEXOR) 25 MG Tab Take 25 mg by mouth 2 (two) times daily.      ALPRAZolam (XANAX) 0.25 MG tablet Take 1 tablet (0.25 mg total) by mouth 3 (three) times daily as needed for Insomnia. 1 Tablet(s) Oral PRN Every 8 hours. (Patient not taking: Reported on 2024) 60 tablet 0    amoxicillin (AMOXIL) 875 MG tablet  (Patient not taking: Reported on 2024)      hydrOXYzine (VISTARIL) 100 MG capsule hydroxyzine pamoate 100 mg capsule (Patient not taking: Reported on 2024)      metroNIDAZOLE (METROGEL) 0.75 % (37.5mg/5 gram) vaginal gel Place 1 applicator vaginally 2  (two) times daily. (Patient not taking: Reported on 7/22/2024) 70 g 0    metroNIDAZOLE (METROGEL) 0.75 % vaginal gel Place 1 applicator vaginally every evening. (Patient not taking: Reported on 7/22/2024) 70 g 1    norethindrone-ethinyl estradiol (MARC 1/20, 21,) 1-20 mg-mcg per tablet Take 1 tablet by mouth once daily. (Patient not taking: Reported on 9/5/2024) 21 tablet 7    omeprazole (PRILOSEC) 40 MG capsule Take 1 capsule (40 mg total) by mouth once daily. 90 capsule 0    sertraline (ZOLOFT) 50 MG tablet sertraline 50 mg tablet (Patient not taking: Reported on 9/5/2024)      terconazole (TERAZOL 3) 0.8 % vaginal cream Place 1 applicator vaginally every evening. (Patient not taking: Reported on 7/22/2024) 20 g 0     No current facility-administered medications for this visit.       Review of patient's allergies indicates:   Allergen Reactions    No known drug allergies        Review of System:   General: no chills, fever, night sweats, weight gain or weight loss  Psychological: no depression or suicidal ideation  Breasts: no new or changing breast lumps, nipple discharge or masses.  Respiratory: no cough, shortness of breath, or wheezing  Cardiovascular: no chest pain or dyspnea on exertion  Gastrointestinal: no abdominal pain, change in bowel habits, or black or bloody stools  Genito-Urinary: no incontinence, urinary frequency/urgency or vulvar/vaginal symptoms, pelvic pain or abnormal vaginal bleeding.  Musculoskeletal: no gait disturbance or muscular weakness                                               General Appearance    A and O x 4, Cooperative, no distress   Breasts    Abdomen   Symmetrical, no masses, no discharge, skin changes , erythema or retraction. No adenopathy  Soft, non-tender, bowel sounds active all four quadrants,  no masses, no organomegaly    Genitourinary:   External rectal exam shows no thrombosed external hemorrhoids.   Pelvic exam was performed with patient supine.  No labial  fusion.  There is no rash, lesion or injury on the right labia.  There is no rash, lesion or injury on the left labia.  No bleeding and no signs of injury around the vaginal introitus, urethra is without lesions and well supported. The cervix is visualized with no discharge, lesions or friability.  No vaginal discharge found.  No significant Cystocele, Enterocele or rectocele, and uterus well supported.  Bimanual exam:  The urethra is normal to palpation and there are no palpable vaginal wall masses.  Uterus is not deviated, not enlarged, not fixed, normal shape and not tender.  Cervix exhibits no motion tenderness.   Right adnexum displays no mass and no tenderness.  Left adnexum displays no mass and no tenderness.   Extremities: Extremities normal, atraumatic, no cyanosis or edema                     NOTE  NURSING PERSONAL PRESENT FOR ENTIRE PHYSICAL EXAM     PAP submitted    U/A    Pt requests prn Xanax refill for episodic anxiety  Discussed dependency potential and risks  Agree to one refill  Rec BSE monthly  Screening mammogram and repeat yearly  RTO 1 year/prn    I spent a total of 30 minutes on the day of the visit. This includes face to face time and non-face to face time preparing to see the patient (eg, review of tests), obtaining and/or reviewing separately obtained history, documenting clinical information in the electronic or other health record, independently interpreting results and communicating results to the patient/family/caregiver, or care coordinator.

## 2024-11-12 ENCOUNTER — PATIENT MESSAGE (OUTPATIENT)
Dept: OBSTETRICS AND GYNECOLOGY | Facility: CLINIC | Age: 39
End: 2024-11-12
Payer: COMMERCIAL

## 2024-11-12 DIAGNOSIS — Z11.3 SCREEN FOR STD (SEXUALLY TRANSMITTED DISEASE): Primary | ICD-10-CM

## 2024-11-13 ENCOUNTER — LAB VISIT (OUTPATIENT)
Dept: LAB | Facility: HOSPITAL | Age: 39
End: 2024-11-13
Attending: SPECIALIST
Payer: COMMERCIAL

## 2024-11-13 DIAGNOSIS — Z11.3 SCREEN FOR STD (SEXUALLY TRANSMITTED DISEASE): ICD-10-CM

## 2024-11-13 LAB
HAV IGM SERPL QL IA: NORMAL
HBV CORE IGM SERPL QL IA: NORMAL
HBV SURFACE AG SERPL QL IA: NORMAL
HCV AB SERPL QL IA: NORMAL
HIV 1+2 AB+HIV1 P24 AG SERPL QL IA: NORMAL
TREPONEMA PALLIDUM IGG+IGM AB [PRESENCE] IN SERUM OR PLASMA BY IMMUNOASSAY: NONREACTIVE

## 2024-11-13 PROCEDURE — 87389 HIV-1 AG W/HIV-1&-2 AB AG IA: CPT | Performed by: SPECIALIST

## 2024-11-13 PROCEDURE — 86593 SYPHILIS TEST NON-TREP QUANT: CPT | Performed by: SPECIALIST

## 2024-11-13 PROCEDURE — 80074 ACUTE HEPATITIS PANEL: CPT | Performed by: SPECIALIST

## 2024-11-13 PROCEDURE — 36415 COLL VENOUS BLD VENIPUNCTURE: CPT | Mod: PO | Performed by: SPECIALIST

## 2024-11-13 PROCEDURE — 86696 HERPES SIMPLEX TYPE 2 TEST: CPT | Performed by: SPECIALIST

## 2024-11-14 LAB
HSV1 IGG SERPL QL IA: NEGATIVE
HSV2 IGG SERPL QL IA: POSITIVE